# Patient Record
Sex: FEMALE | Race: WHITE | NOT HISPANIC OR LATINO | Employment: FULL TIME | URBAN - METROPOLITAN AREA
[De-identification: names, ages, dates, MRNs, and addresses within clinical notes are randomized per-mention and may not be internally consistent; named-entity substitution may affect disease eponyms.]

---

## 2017-05-02 ENCOUNTER — HOSPITAL ENCOUNTER (OUTPATIENT)
Dept: RADIOLOGY | Facility: MEDICAL CENTER | Age: 57
End: 2017-05-02

## 2017-05-07 ASSESSMENT — ENCOUNTER SYMPTOMS
HEMOPTYSIS: 0
DYSPNEA AT REST: 1
SHORTNESS OF BREATH: 1
WHEEZING: 0
RESPIRATORY SYMPTOMS COMMENTS: SLOW PACE
CHEST TIGHTNESS: 0

## 2017-05-09 ENCOUNTER — HOSPITAL ENCOUNTER (OUTPATIENT)
Dept: RADIOLOGY | Facility: MEDICAL CENTER | Age: 57
End: 2017-05-09

## 2017-05-09 ENCOUNTER — OFFICE VISIT (OUTPATIENT)
Dept: PULMONOLOGY | Facility: HOSPICE | Age: 57
End: 2017-05-09
Payer: COMMERCIAL

## 2017-05-09 VITALS
DIASTOLIC BLOOD PRESSURE: 70 MMHG | BODY MASS INDEX: 20.67 KG/M2 | TEMPERATURE: 97.5 F | WEIGHT: 128.6 LBS | HEIGHT: 66 IN | HEART RATE: 82 BPM | SYSTOLIC BLOOD PRESSURE: 126 MMHG | RESPIRATION RATE: 18 BRPM

## 2017-05-09 DIAGNOSIS — R63.4 WEIGHT LOSS, UNINTENTIONAL: ICD-10-CM

## 2017-05-09 DIAGNOSIS — R06.02 SOB (SHORTNESS OF BREATH): ICD-10-CM

## 2017-05-09 DIAGNOSIS — Z78.9 NONSMOKER: ICD-10-CM

## 2017-05-09 DIAGNOSIS — R93.89 NONSPECIFIC ABNORMAL FINDINGS ON RADIOLOGICAL AND EXAMINATION OF INTRATHORACIC ORGANS: ICD-10-CM

## 2017-05-09 PROCEDURE — 99204 OFFICE O/P NEW MOD 45 MIN: CPT | Performed by: INTERNAL MEDICINE

## 2017-05-09 NOTE — PROGRESS NOTES
"Chief Complaint   Patient presents with   • Establish Care     Referred by  for Lung Mass.       HPI: This patient is a 56 y.o. Female from [place], [place] who is referred for abnormal chest CAT scan. Over the past 6 months she has had epigastric pain after eating associated with nausea and a 30 pound weight loss. She has felt weak and short of breath with mild exertion. She has a mild cough in the mornings, denies hemoptysis or sputum purulence. Abdominal CAT scan showed thickening of the ascending colon and hepatic flexure as well as a right hilar density and left paraesophageal necrotic lymph node. She was referred to gastroenterology with unremarkable endoscopy and colonoscopy per her report. She subsequently had a chest CAT scan on May 1, 2017 showing bilateral hilar and mediastinal lymphadenopathy, with lymph node calcifications, as well as reticular nodular opacities diffusely, for which she is referred for workup. She had a chest CAT scan in 2010 showing calcified hilar lymph nodes however no significant adenopathy.  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 She denies history of any pulmonary disease.      Past Medical History   Diagnosis Date   • Anesthesia      \"had a problem one time\" Epinepherine causes palpatations   • Heart burn    • Pain 08-12-10     neck, shoulders, and arms, 10/10   • Kidney stone    • Chickenpox    • Mumps        Social History     Social History   • Marital Status:      Spouse Name: N/A   • Number of Children: N/A   • Years of Education: N/A     Occupational History   • Not on file.     Social History Main Topics   • Smoking status: Never Smoker    • Smokeless tobacco: Not on file   • Alcohol Use: No   • Drug Use: No   • Sexual Activity: Not on file     Other Topics Concern   • Not on file     Social History Narrative       Family History   Problem Relation Age of Onset   • Heart Disease     • Hypertension     • " "Stroke     • Asthma Mother        Current Outpatient Prescriptions on File Prior to Visit   Medication Sig Dispense Refill   • oxycodone-acetaminophen (PERCOCET) 5-325 MG TABS Take 1-2 Tabs by mouth every 6 hours as needed.     • Acetaminophen (TYLENOL PO) Take 2 Tabs by mouth as needed.     • promethazine (PHENERGAN) 25 MG TABS Take 25 mg by mouth every four hours as needed.     • Oxycodone-Acetaminophen (ROXICET PO) Take 1 Tab by mouth as needed.       No current facility-administered medications on file prior to visit.       Allergies: Fish; Shellfish allergy; Other environmental; and Epinephrine    ROS:   Constitutional: Denies fevers, chills, night sweats, +fatigue, +weight loss  Eyes: Denies vision loss, pain, drainage, double vision  Ears, Nose, Throat: Denies earache, difficulty hearing, tinnitus, nasal congestion, hoarseness  Cardiovascular: Denies chest pain, tightness, palpitations, orthopnea or edema  Respiratory: As in history of present illness  Sleep: Denies daytime sleepiness, snoring, apneas, insomnia, morning headaches  GI: Denies heartburn, dysphagia, +nausea,+ abdominal pain, denies diarrhea or constipation  : Denies frequent urination, hematuria, discharge or painful urination  Musculoskeletal: Denies back pain, painful joints, sore muscles  Neurological: + Generalized weakness , +headaches  Skin: No rashes    Blood pressure 126/70, pulse 82, temperature 36.4 °C (97.5 °F), temperature source Temporal, resp. rate 18, height 1.676 m (5' 5.98\"), weight 58.333 kg (128 lb 9.6 oz).  Multi-Ox Readings  Multi Ox #1     O2 sat % at rest     O2 sat % on exertion     O2 sat average on exertion     Multi Ox #2     O2 sat % at rest     O2 sat % on exertion     O2 sat average on exertion       Oxygen Use     Oxygen Frequency     Duration of need     Is the patient mobile within the home?     CPAP Use?     BIPAP Use?     Servo Titration         Physical Exam:  Appearance: Well-nourished, well-developed, in " no acute distress  HEENT: Normocephalic, atraumatic, white sclera, PERRLA, oropharynx clear  Neck: No adenopathy or masses  Respiratory: no intercostal retractions or accessory muscle use  Lungs auscultation: Clear to auscultation bilaterally  Cardiovascular: Regular rate rhythm. No murmurs, rubs or gallops.  No LE edema  Abdomen: soft, nondistended, nontender to palpation  Gait: Normal  Digits: No clubbing, cyanosis  Motor: No focal deficits  Orientation: Oriented to time, person and place    Diagnosis:  1. Nonspecific abnormal findings on radiological and examination of intrathoracic organs  QUANTIFERON TB GOLD (IN TUBE)    CBC WITH DIFFERENTIAL    COMP METABOLIC PANEL    ANGIOTENSIN I CONVERTING ENZYME    COCCIDIOMYCOSIS PANEL    BRONCHOSCOPY   2. SOB (shortness of breath)     3. Weight loss, unintentional     4. Nonsmoker         Plan:  The patient presents with epigastric pain, unintentional weight loss, with chest CAT scan showing bulky hilar and mediastinal lymphadenopathy with diffuse reticulornodular opacites. She has evidence of prior granulomatous disease.  Potential etiologies include malignancy eg.lymphoma, sarcoidosis, infection (eg. fungal, mycobacterial disease).  Recommend QuantiFERON TB GOLD, coccidiomycosis antibodies.  Obtain complete blood count and comprehensive metabolic panel, ACE level.  Navigational bronchoscopy for lymph node and lung biopsy.  Return for after testing.        Answers for HPI/ROS submitted by the patient on 5/7/2017   What is the reason for your visit today?: Ct scan  Have to stop when walking or walk at a slow pace? : October , Slow pace  Do you cough first thing in the morning or at other times during the day?: Morning   Do you have a cough on most days? If so, how long have you had this cough?: No  Bring up phlegm (mucus, sputum) in the morning or other times during the day?: Mucus/morning   If so, how long have you produced phlegm (Months, Years), and what is the usual  color of your phlegm?: ?  Do you cough up blood from your chest?: No  Do you experience wheezing?: No  Do you experience any chest tightness?: No  Experience shortness of breath?: Yes  Experience shortness of breath at rest?: Yes  How far can you walk before becoming short of breath or need to rest? : Not far  Please list what causes you to become short of breath:: Moving  Have you ever been hospitalized?: Yes  Reason, year, and hospital in which you were hospitalized:: [facility]  Have you ever needed to be intubated or placed on a ventilator? : No  Have you ever had problems with anesthesia?: No  Have you experienced post-operative delirium?: No  Any complications with surgery?: No  What year did you receive your last Flu shot?: [facility]2016  Have you had a TB skin test? If so, please list the year and result:: 2016 ok  Have you had Allergy skin testing? If so, please list the year and result:: To long ago   Please list all your occupations from your first job to your current job. Include Industry/Company, location, year, and your specific job.: [employer],  Please list the places you have lived, the year, and Country or State in the [place]:: [facility] or,[facility]  Birds?: Yes  Dogs?: Yes  Cats?: Yes  Mice and/or Deer Mice?: Yes  Reptiles?: No  Coffee: 2  Tea: 1

## 2017-05-09 NOTE — MR AVS SNAPSHOT
"        Alesia Lewis   2017 10:20 AM   Office Visit   MRN: [id number]    Department:  [facility]   Dept Phone:  [phone]    Description:  Female : 1960   Provider:  Karon Overton M.D.           Reason for Visit     Establish Care Referred by  for Lung Mass.      Allergies as of 2017     Allergen Noted Reactions    Fish 2010   Anaphylaxis    Shellfish Allergy 2010   Anaphylaxis    Other Environmental 2010   Rash    Perfums, bleach, soaps  [facility]    Epinephrine 2010   Palpitations      You were diagnosed with     Nonspecific abnormal findings on radiological and examination of intrathoracic organs   [[id number]]         Vital Signs     Blood Pressure Pulse Temperature Respirations Height Weight    126/70 mmHg 82 36.4 °C (97.5 °F) (Temporal) 18 1.676 m (5' 5.98\") 58.333 kg (128 lb 9.6 oz)    Body Mass Index Smoking Status                20.77 kg/m2 Never Smoker           Basic Information     Date Of Birth Sex Race Ethnicity Preferred Language    1960 Female White Non- English      Your appointments     May 09, 2017 11:00 AM   [facility] with CT FOREIGN OUTSIDE FILM   IMAGING SUPPORT [facility]    [address] [place] [id number]   [phone]              Health Maintenance     Patient has no pending health maintenance at this time      Current Immunizations     No immunizations on file.      Below and/or attached are the medications your provider expects you to take. Review all of your home medications and newly ordered medications with your provider and/or pharmacist. Follow medication instructions as directed by your provider and/or pharmacist. Please keep your medication list with you and share with your provider. Update the information when medications are discontinued, doses are changed, or new medications (including over-the-counter products) are added; and carry medication information at all times " in the event of emergency situations     Allergies:  FISH - Anaphylaxis     SHELLFISH ALLERGY - Anaphylaxis     OTHER ENVIRONMENTAL - Rash     EPINEPHRINE - Palpitations               Medications  Valid as of: May 09, 2017 - 10:54 AM    Generic Name Brand Name Tablet Size Instructions for use    Acetaminophen   Take 2 Tabs by mouth as needed.        Oxycodone-Acetaminophen   Take 1 Tab by mouth as needed.        Oxycodone-Acetaminophen (Tab) PERCOCET 5-325 MG Take 1-2 Tabs by mouth every 6 hours as needed.        Promethazine HCl (Tab) PHENERGAN 25 MG Take 25 mg by mouth every four hours as needed.        .                 Medicines prescribed today were sent to:     [facility] [address] [facility]    [address] [place] [id number]    Phone: [phone] Fax: [phone]    Open 24 Hours?: No      Medication refill instructions:       If your prescription bottle indicates you have medication refills left, it is not necessary to call your provider’s office. Please contact your pharmacy and they will refill your medication.    If your prescription bottle indicates you do not have any refills left, you may request refills at any time through one of the following ways: The online [employer] system (except Urgent Care), by calling your provider’s office, or by asking your pharmacy to contact your provider’s office with a refill request. Medication refills are processed only during regular business hours and may not be available until the next business day. Your provider may request additional information or to have a follow-up visit with you prior to refilling your medication.   *Please Note: Medication refills are assigned a new Rx number when refilled electronically. Your pharmacy may indicate that no refills were authorized even though a new prescription for the same medication is available at the pharmacy. Please request the medicine by name with the pharmacy before contacting your  provider for a refill.        Your To Do List     Future Labs/Procedures Complete By Expires    ANGIOTENSIN I CONVERTING ENZYME  As directed 5/9/2018    BRONCHOSCOPY  As directed 5/9/2018    Comments:    EBUS    CBC WITH DIFFERENTIAL  As directed 5/9/2018    COMP METABOLIC PANEL  As directed 5/9/2018         MyChart Access Code: Activation code not generated  Current MyChart Status: Active

## 2017-05-18 ENCOUNTER — APPOINTMENT (OUTPATIENT)
Dept: RADIOLOGY | Facility: MEDICAL CENTER | Age: 57
End: 2017-05-18
Attending: INTERNAL MEDICINE
Payer: COMMERCIAL

## 2017-05-18 ENCOUNTER — HOSPITAL ENCOUNTER (OUTPATIENT)
Facility: MEDICAL CENTER | Age: 57
End: 2017-05-18
Attending: INTERNAL MEDICINE | Admitting: INTERNAL MEDICINE
Payer: COMMERCIAL

## 2017-05-18 VITALS
OXYGEN SATURATION: 97 % | RESPIRATION RATE: 16 BRPM | HEIGHT: 66 IN | HEART RATE: 60 BPM | TEMPERATURE: 98.7 F | SYSTOLIC BLOOD PRESSURE: 112 MMHG | DIASTOLIC BLOOD PRESSURE: 76 MMHG | WEIGHT: 127.21 LBS | BODY MASS INDEX: 20.44 KG/M2

## 2017-05-18 PROBLEM — R93.89 ABNORMAL RADIOGRAPHIC EXAMINATION: Status: ACTIVE | Noted: 2017-05-18

## 2017-05-18 LAB
APPEARANCE FLD: CLEAR
BASOPHILS NFR FLD: 2 %
BODY FLD TYPE: NORMAL
COLOR FLD: COLORLESS
CSF COMMENTS 1658: NORMAL
EOSINOPHIL NFR FLD: 1 %
GRAM STN SPEC: NORMAL
HISTIOCYTES NFR FLD: 5 %
LYMPHOCYTES NFR FLD: 51 %
MONONUC CELLS NFR FLD: 11 %
NEUTROPHILS NFR FLD: 3 %
RBC # FLD: 111 CELLS/UL
RHODAMINE-AURAMINE STN SPEC: NORMAL
SIGNIFICANT IND 70042: NORMAL
SIGNIFICANT IND 70042: NORMAL
SITE SITE: NORMAL
SITE SITE: NORMAL
SOURCE SOURCE: NORMAL
SOURCE SOURCE: NORMAL
WBC # FLD: 65 CELLS/UL
WBC OTHER NFR FLD: 27 %

## 2017-05-18 PROCEDURE — 88305 TISSUE EXAM BY PATHOLOGIST: CPT | Mod: 59

## 2017-05-18 PROCEDURE — 88173 CYTOPATH EVAL FNA REPORT: CPT

## 2017-05-18 PROCEDURE — 87015 SPECIMEN INFECT AGNT CONCNTJ: CPT

## 2017-05-18 PROCEDURE — 700111 HCHG RX REV CODE 636 W/ 250 OVERRIDE (IP)

## 2017-05-18 PROCEDURE — 160041 HCHG SURGERY MINUTES - EA ADDL 1 MIN LEVEL 4: Performed by: INTERNAL MEDICINE

## 2017-05-18 PROCEDURE — 87206 SMEAR FLUORESCENT/ACID STAI: CPT

## 2017-05-18 PROCEDURE — 160009 HCHG ANES TIME/MIN: Performed by: INTERNAL MEDICINE

## 2017-05-18 PROCEDURE — 87102 FUNGUS ISOLATION CULTURE: CPT

## 2017-05-18 PROCEDURE — 87116 MYCOBACTERIA CULTURE: CPT

## 2017-05-18 PROCEDURE — 160029 HCHG SURGERY MINUTES - 1ST 30 MINS LEVEL 4: Performed by: INTERNAL MEDICINE

## 2017-05-18 PROCEDURE — 700101 HCHG RX REV CODE 250

## 2017-05-18 PROCEDURE — 502240 HCHG MISC OR SUPPLY RC 0272: Performed by: INTERNAL MEDICINE

## 2017-05-18 PROCEDURE — 89051 BODY FLUID CELL COUNT: CPT

## 2017-05-18 PROCEDURE — 87075 CULTR BACTERIA EXCEPT BLOOD: CPT

## 2017-05-18 PROCEDURE — 71010 DX-CHEST-PORTABLE (1 VIEW): CPT

## 2017-05-18 PROCEDURE — 88312 SPECIAL STAINS GROUP 1: CPT

## 2017-05-18 PROCEDURE — 700102 HCHG RX REV CODE 250 W/ 637 OVERRIDE(OP)

## 2017-05-18 PROCEDURE — 160035 HCHG PACU - 1ST 60 MINS PHASE I: Performed by: INTERNAL MEDICINE

## 2017-05-18 PROCEDURE — 88172 CYTP DX EVAL FNA 1ST EA SITE: CPT

## 2017-05-18 PROCEDURE — 87205 SMEAR GRAM STAIN: CPT

## 2017-05-18 PROCEDURE — 160048 HCHG OR STATISTICAL LEVEL 1-5: Performed by: INTERNAL MEDICINE

## 2017-05-18 PROCEDURE — 88112 CYTOPATH CELL ENHANCE TECH: CPT

## 2017-05-18 PROCEDURE — 501629 HCHG TUBE, LUKI TRAP STERILE DISP: Performed by: INTERNAL MEDICINE

## 2017-05-18 PROCEDURE — A9270 NON-COVERED ITEM OR SERVICE: HCPCS

## 2017-05-18 PROCEDURE — 160036 HCHG PACU - EA ADDL 30 MINS PHASE I: Performed by: INTERNAL MEDICINE

## 2017-05-18 PROCEDURE — 160002 HCHG RECOVERY MINUTES (STAT): Performed by: INTERNAL MEDICINE

## 2017-05-18 PROCEDURE — 87070 CULTURE OTHR SPECIMN AEROBIC: CPT

## 2017-05-18 RX ORDER — SODIUM CHLORIDE, SODIUM LACTATE, POTASSIUM CHLORIDE, CALCIUM CHLORIDE 600; 310; 30; 20 MG/100ML; MG/100ML; MG/100ML; MG/100ML
1000 INJECTION, SOLUTION INTRAVENOUS
Status: COMPLETED | OUTPATIENT
Start: 2017-05-18 | End: 2017-05-18

## 2017-05-18 RX ORDER — LIDOCAINE HYDROCHLORIDE 10 MG/ML
INJECTION, SOLUTION INFILTRATION; PERINEURAL
Status: DISCONTINUED
Start: 2017-05-18 | End: 2017-05-18 | Stop reason: HOSPADM

## 2017-05-18 RX ORDER — LIDOCAINE HYDROCHLORIDE 10 MG/ML
INJECTION, SOLUTION INFILTRATION; PERINEURAL
Status: DISCONTINUED | OUTPATIENT
Start: 2017-05-18 | End: 2017-05-18 | Stop reason: HOSPADM

## 2017-05-18 RX ADMIN — HYDROCODONE BITARTRATE AND ACETAMINOPHEN 30 ML: 2.5; 108 SOLUTION ORAL at 11:28

## 2017-05-18 RX ADMIN — FENTANYL CITRATE 25 MCG: 50 INJECTION, SOLUTION INTRAMUSCULAR; INTRAVENOUS at 11:28

## 2017-05-18 RX ADMIN — SODIUM CHLORIDE, SODIUM LACTATE, POTASSIUM CHLORIDE, CALCIUM CHLORIDE 1000 ML: 600; 310; 30; 20 INJECTION, SOLUTION INTRAVENOUS at 08:20

## 2017-05-18 ASSESSMENT — PAIN SCALES - GENERAL
PAINLEVEL_OUTOF10: 0
PAINLEVEL_OUTOF10: 5
PAINLEVEL_OUTOF10: 0
PAINLEVEL_OUTOF10: 4
PAINLEVEL_OUTOF10: 1
PAINLEVEL_OUTOF10: 0
PAINLEVEL_OUTOF10: 4
PAINLEVEL_OUTOF10: 1

## 2017-05-18 NOTE — DISCHARGE INSTRUCTIONS
ACTIVITY: Rest and take it easy for the first 24 hours.  A responsible adult is recommended to remain with you during that time.  It is normal to feel sleepy.  We encourage you to not do anything that requires balance, judgment or coordination.    MILD FLU-LIKE SYMPTOMS ARE NORMAL. YOU MAY EXPERIENCE GENERALIZED MUSCLE ACHES, THROAT IRRITATION, HEADACHE AND/OR SOME NAUSEA.    FOR 24 HOURS DO NOT:  Drive, operate machinery or run household appliances.  Drink beer or alcoholic beverages.   Make important decisions or sign legal documents.    SPECIAL INSTRUCTIONS: ***    DIET: To avoid nausea, slowly advance diet as tolerated, avoiding spicy or greasy foods for the first day.  Add more substantial food to your diet according to your physician's instructions.  Babies can be fed formula or breast milk as soon as they are hungry.  INCREASE FLUIDS AND FIBER TO AVOID CONSTIPATION.    SURGICAL DRESSING/BATHING: *MAY SHOWER TOMORROW    FOLLOW-UP APPOINTMENT:  A follow-up appointment should be arranged with your doctor in *FOLLOW UP WITH DR. HART**; call to schedule.    You should CALL YOUR PHYSICIAN if you develop:  Fever greater than 101 degrees F.  Pain not relieved by medication, or persistent nausea or vomiting.  Excessive bleeding (blood soaking through dressing) or unexpected drainage from the wound.  Extreme redness or swelling around the incision site, drainage of pus or foul smelling drainage.  Inability to urinate or empty your bladder within 8 hours.  Problems with breathing or chest pain.    You should call 911 if you develop problems with breathing or chest pain.  If you are unable to contact your doctor or surgical center, you should go to the nearest emergency room or urgent care center.  Physician's telephone #: *[phone]*    If any questions arise, call your doctor.  If your doctor is not available, please feel free to call the Surgical Center at [phone].  The Center is open Monday through Friday  from 7AM to 7PM.  You can also call the HEALTH HOTLINE open 24 hours/day, 7 days/week and speak to a nurse at [phone], or toll free at [phone].    A registered nurse may call you a few days after your surgery to see how you are doing after your procedure.    MEDICATIONS: Resume taking daily medication.  Take prescribed pain medication with food.  If no medication is prescribed, you may take non-aspirin pain medication if needed.  PAIN MEDICATION CAN BE VERY CONSTIPATING.  Take a stool softener or laxative such as senokot, pericolace, or milk of magnesia if needed.     Prescription given for *NONE**.  Last pain medication given at *____________11:28 AM__________________*.    If your physician has pr***escribed pain medication that includes Acetaminophen (Tylenol), do not take additional Acetaminophen (Tylenol) while taking the prescribed medication.    Depression / Suicide Risk    As you are discharged from this [facility] facility, it is important to learn how to keep safe from harming yourself.    Recognize the warning signs:  · Abrupt changes in personality, positive or negative- including increase in energy   · Giving away possessions  · Change in eating patterns- significant weight changes-  positive or negative  · Change in sleeping patterns- unable to sleep or sleeping all the time   · Unwillingness or inability to communicate  · Depression  · Unusual sadness, discouragement and loneliness  · Talk of wanting to die  · Neglect of personal appearance   · Rebelliousness- reckless behavior  · Withdrawal from people/activities they love  · Confusion- inability to concentrate     If you or a loved one observes any of these behaviors or has concerns about self-harm, here's what you can do:  · Talk about it- your feelings and reasons for harming yourself  · Remove any means that you might use to hurt yourself (examples: pills, rope, extension cords, firearm)  · Get professional help from the community  (Mental Health, Substance Abuse, psychological counseling)  · Do not be alone:Call your Safe Contact- someone whom you trust who will be there for you.  · Call your local CRISIS HOTLINE 486-9414 or 825-947-3178  · Call your local Children's Mobile Crisis Response Team [place] (075) 814-3584 or www.[employer]  · Call the toll free National Suicide Prevention Hotlines   · National Suicide Prevention Lifeline 155-470-FDJU (2192)  [place] [facility] 800-SUICIDE (248-0179)        Bronchoscopy Discharge Instructions  Home Care Instructions    ACTIVITY: Rest and take it easy for the first 24 hours.  A responsible adult is recommended to remain with you during that time.  It is normal to feel sleepy.  We encourage you to not do anything that requires balance, judgment or coordination.    The medicine you had during the bronchoscopy will make you sleepy.    FOR 24 HOURS DO NOT:  Drive, operate machinery or run household appliances.  Drink beer or alcoholic beverages.  Make important decisions or sign legal documents.  Engage in activity that requires sharp judgment and reflexes for 24 hours    SPECIAL INSTRUCTIONS: ***    Bronchoscopy is a procedure to look inside your windpipe and bronchial tubes.  An anesthetic solution is sprayed in your throat to make it numb.    You may experience a mild sore throat, hoarseness, fever up to 101?F, and /or coughing up small amounts of blood immediately following your bronchoscopy, especially if a biopsy was performed.  The discomfort should subside in 24-48 hours.    Do not smoke for 6-8 hours after the procedure to decrease your risk of coughing and /or bleeding.    Do not drink fluids or eat until your gag reflex returns, for two hours after the bronchoscopy.  Otherwise you will not feel the food or fluid in your throat, and it may go down your windpipe and cause you to choke.    Take ice chips or slowly sip cool fluids to make sure your gag reflex has returned.   Avoid hot fluids from the microwave for several hours.    After 2 hours or when you get home you may take throat lozenges or gargle with salt water if your throat is sore.  Drink liquids to help dryness in your mouth and throat.    Resume your normal activities the following day.    MEDICATIONS: Resume taking daily medication as directed by your doctor.  Other Medications:***    A follow-up appointment should be arranged with your doctor in 1 week to get the results of the bronchoscopy and any tests done during the procedure; call to schedule.***      You should CALL YOUR PHYSICIAN if you develop:  Fever greater than 101?F  You cough up more than a teaspoon of blood other than blood-tinged mucus  You have increasing amounts of bleeding from coughing after the bronchoscopy  You are wheezing  You develop any unusual signs or symptoms or have any questions                You should call 911 if you develop problems with breathing or chest pain.    If you are unable to contact your doctor or surgical center, you should go to the nearest emergency room or urgent care center.  Physician's telephone #: ***      If any questions arise, call your doctor.  If your doctor is not available, please feel free to call the Surgical Center at {Surgical Dept Numbers:***}.  The Center is open Monday through Friday from 7AM to 7PM.  You can also call the [employer] HOTLINE open 24 hours/day, 7 days/week and speak to a nurse at [phone], or toll free at [phone].    · You may receive a survey in the mail within the next two weeks and we ask that you take a few moments to complete the survey and return it to us.  Our goal is to provide you with very good care and we value your comments.

## 2017-05-18 NOTE — IP AVS SNAPSHOT
" Home Care Instructions                                                                                                                Name:Alesia Lewis  Medical Record Number:[id number]  CSN: [id number]    YOB: 1960   Age: 56 y.o.  Sex: female  HT:1.676 m (5' 6\") WT: 57.7 kg (127 lb 3.3 oz)          Admit Date: 5/18/2017     Discharge Date:   Today's Date: 5/18/2017  Attending Doctor:  Adonis Hart M.D.                  Allergies:  Fish; Shellfish allergy; Other environmental; Epinephrine; Food; and Latex                Discharge Instructions         ACTIVITY: Rest and take it easy for the first 24 hours.  A responsible adult is recommended to remain with you during that time.  It is normal to feel sleepy.  We encourage you to not do anything that requires balance, judgment or coordination.    MILD FLU-LIKE SYMPTOMS ARE NORMAL. YOU MAY EXPERIENCE GENERALIZED MUSCLE ACHES, THROAT IRRITATION, HEADACHE AND/OR SOME NAUSEA.    FOR 24 HOURS DO NOT:  Drive, operate machinery or run household appliances.  Drink beer or alcoholic beverages.   Make important decisions or sign legal documents.    SPECIAL INSTRUCTIONS: ***    DIET: To avoid nausea, slowly advance diet as tolerated, avoiding spicy or greasy foods for the first day.  Add more substantial food to your diet according to your physician's instructions.  Babies can be fed formula or breast milk as soon as they are hungry.  INCREASE FLUIDS AND FIBER TO AVOID CONSTIPATION.    SURGICAL DRESSING/BATHING: *MAY SHOWER TOMORROW    FOLLOW-UP APPOINTMENT:  A follow-up appointment should be arranged with your doctor in *FOLLOW UP WITH DR. HART**; call to schedule.    You should CALL YOUR PHYSICIAN if you develop:  Fever greater than 101 degrees F.  Pain not relieved by medication, or persistent nausea or vomiting.  Excessive bleeding (blood soaking through dressing) or unexpected drainage from the wound.  Extreme redness or swelling around the incision site, " drainage of pus or foul smelling drainage.  Inability to urinate or empty your bladder within 8 hours.  Problems with breathing or chest pain.    You should call 911 if you develop problems with breathing or chest pain.  If you are unable to contact your doctor or surgical center, you should go to the nearest emergency room or urgent care center.  Physician's telephone #: *[phone]*    If any questions arise, call your doctor.  If your doctor is not available, please feel free to call the Surgical Center at [phone].  The Center is open Monday through Friday from 7AM to 7PM.  You can also call the [place] HOTLINE open 24 hours/day, 7 days/week and speak to a nurse at [phone], or toll free at [phone].    A registered nurse may call you a few days after your surgery to see how you are doing after your procedure.    MEDICATIONS: Resume taking daily medication.  Take prescribed pain medication with food.  If no medication is prescribed, you may take non-aspirin pain medication if needed.  PAIN MEDICATION CAN BE VERY CONSTIPATING.  Take a stool softener or laxative such as senokot, pericolace, or milk of magnesia if needed.    Prescription given for *LINSEY**.  Last pain medication given at *______________________________*.    If your physician has pr***escribed pain medication that includes Acetaminophen (Tylenol), do not take additional Acetaminophen (Tylenol) while taking the prescribed medication.    Depression / Suicide Risk    As you are discharged from this [facility] facility, it is important to learn how to keep safe from harming yourself.    Recognize the warning signs:  · Abrupt changes in personality, positive or negative- including increase in energy   · Giving away possessions  · Change in eating patterns- significant weight changes-  positive or negative  · Change in sleeping patterns- unable to sleep or sleeping all the time   · Unwillingness or inability to  communicate  · Depression  · Unusual sadness, discouragement and loneliness  · Talk of wanting to die  · Neglect of personal appearance   · Rebelliousness- reckless behavior  · Withdrawal from people/activities they love  · Confusion- inability to concentrate     If you or a loved one observes any of these behaviors or has concerns about self-harm, here's what you can do:  · Talk about it- your feelings and reasons for harming yourself  · Remove any means that you might use to hurt yourself (examples: pills, rope, extension cords, firearm)  · Get professional help from the community (Mental Health, Substance Abuse, psychological counseling)  · Do not be alone:Call your Safe Contact- someone whom you trust who will be there for you.  · Call your local CRISIS HOTLINE 887-2217 or 492-194-7344  · Call your local Children's Mobile Crisis Response Team [place] (013) 340-9769 or www.RxVault.in  · Call the toll free National Suicide Prevention Hotlines   · National Suicide Prevention Lifeline 098-415-TENU (4793)  [place] Wifi Online Line Network 800-SUICIDE (454-5809)        Bronchoscopy Discharge Instructions  Home Care Instructions    ACTIVITY: Rest and take it easy for the first 24 hours.  A responsible adult is recommended to remain with you during that time.  It is normal to feel sleepy.  We encourage you to not do anything that requires balance, judgment or coordination.    The medicine you had during the bronchoscopy will make you sleepy.    FOR 24 HOURS DO NOT:  Drive, operate machinery or run household appliances.  Drink beer or alcoholic beverages.  Make important decisions or sign legal documents.  Engage in activity that requires sharp judgment and reflexes for 24 hours    SPECIAL INSTRUCTIONS: ***    Bronchoscopy is a procedure to look inside your windpipe and bronchial tubes.  An anesthetic solution is sprayed in your throat to make it numb.    You may experience a mild sore throat, hoarseness, fever up  to 101?F, and /or coughing up small amounts of blood immediately following your bronchoscopy, especially if a biopsy was performed.  The discomfort should subside in 24-48 hours.    Do not smoke for 6-8 hours after the procedure to decrease your risk of coughing and /or bleeding.    Do not drink fluids or eat until your gag reflex returns, for two hours after the bronchoscopy.  Otherwise you will not feel the food or fluid in your throat, and it may go down your windpipe and cause you to choke.    Take ice chips or slowly sip cool fluids to make sure your gag reflex has returned.  Avoid hot fluids from the microwave for several hours.    After 2 hours or when you get home you may take throat lozenges or gargle with salt water if your throat is sore.  Drink liquids to help dryness in your mouth and throat.    Resume your normal activities the following day.    MEDICATIONS: Resume taking daily medication as directed by your doctor.  Other Medications:***    A follow-up appointment should be arranged with your doctor in 1 week to get the results of the bronchoscopy and any tests done during the procedure; call to schedule.***      You should CALL YOUR PHYSICIAN if you develop:  Fever greater than 101?F  You cough up more than a teaspoon of blood other than blood-tinged mucus  You have increasing amounts of bleeding from coughing after the bronchoscopy  You are wheezing  You develop any unusual signs or symptoms or have any questions                You should call 911 if you develop problems with breathing or chest pain.    If you are unable to contact your doctor or surgical center, you should go to the nearest emergency room or urgent care center.  Physician's telephone #: ***      If any questions arise, call your doctor.  If your doctor is not available, please feel free to call the Surgical Center at {Surgical Dept Numbers:***}.  The Center is open Monday through Friday from 7AM to 7PM.  You can also call the  HEALTH HOTLINE open 24 hours/day, 7 days/week and speak to a nurse at [phone], or toll free at [phone].    · You may receive a survey in the mail within the next two weeks and we ask that you take a few moments to complete the survey and return it to us.  Our goal is to provide you with very good care and we value your comments.       Medication List      ASK your doctor about these medications        Instructions    Morning Afternoon Evening Bedtime    TYLENOL PO        Take 2 Tabs by mouth as needed.   Dose:  2 Tab                                Medication Information     Above and/or attached are the medications your physician expects you to take upon discharge. Review all of your home medications and newly ordered medications with your doctor and/or pharmacist. Follow medication instructions as directed by your doctor and/or pharmacist. Please keep your medication list with you and share with your physician. Update the information when medications are discontinued, doses are changed, or new medications (including over-the-counter products) are added; and carry medication information at all times in the event of emergency situations.        Resources     Quit Smoking / Tobacco Use:    I understand the use of any tobacco products increases my chance of suffering from future heart disease or stroke and could cause other illnesses which may shorten my life. Quitting the use of tobacco products is the single most important thing I can do to improve my health. For further information on smoking / tobacco cessation call a Toll Free Quit Line at 1-744.117.2311 (*National Cancer [place]) or 1-965.419.2410 (American Lung Association) or you can access the web based program at www.lungusa.org.    [place] Tobacco Users Help Line:  (303) 673-3240       Toll Free: 1-271.355.4604  Quit Tobacco Program [facility] (600)305-5757    Crisis Hotline:    [place] Crisis Hotline:  6-384-VDZDRRI or  1-873.808.8472    [place] Crisis Hotline:    1-978.747.5497 or 947-508-8287    Discharge Survey:   Thank you for choosing [facility]. We hope we did everything we could to make your hospital stay a pleasant one. You may be receiving a survey and we would appreciate your time and participation in answering the questions. Your input is very valuable to us in our efforts to improve our service to our patients and their families.            Signatures     My signature on this form indicates that:    1. I acknowledge receipt and understanding of these Home Care Instruction.  2. My questions regarding this information have been answered to my satisfaction.  3. I have formulated a plan with my discharge nurse to obtain my prescribed medications for home.    __________________________________      __________________________________                   Patient Signature                                 Guardian/Responsible Adult Signature      __________________________________                 __________       ________                       Nurse Signature                                               Date                 Time

## 2017-05-18 NOTE — PROCEDURES
"Bronchoscopy Op Note    Date of Operation: 5/18/2017    Preoperative diagnosis: pulmonary infiltrate (793.1) and hilar/mediastinal lymphadenopathy    Postoperative diagnosis: pulmonary infiltrate (793.1)    Operation preformed: Bronchoscopy    Surgeon/Endoscopist: Dr. Warner    Anesthesia: General Anesthesia    Indications: The patient is a 56 y.o. female with pulmonary infiltrate (793.1). Fiberoptic bronchoscopy with endobronchial ultrasound is carried out in the [facility].    Findings: findings included lymphadenopathy.    Specimen: Bronchoalveolar lavage fluid for Gram stain and quantitative cultures. FNA of LN station 11L, 7, 11R, transbronchial biopsy of RUL and RLL.    Procedure: Following informed consent, the patient was properly identified and optimally positioned in bed.  The patient was preoxygenated with 100% oxygen and placed on a set ventilator rate.  A \"time out\" was initiated. The correct patient, procedure and operative site were verified. The patient's allergy status was assessed. Procedural modifications were made as required.    The therapeutic fiberoptic bronchoscope was first advanced through the LMA device. The vocal cords appeared normal and movement was without abnormalities. Topical lidocaine was used to anesthetize this region. Scope was advanced, with topical lidocaine again being applied to the proximal, distal trachea, as well as jose and right and left main stem bronch. The endobronchial mucosa was normal and without inflammation or friability. No lesions were observed. There were mild amounts of thin, clear secretions, possibly due to bronchorrhea from lidocaine. Scope was advanced to RML subsegments and wedged in position. 90ml of sterile saline was instilled, with 35ml being aspirated back. This was sent for analysis. Next, the RUL subsegments were located and 6 passes were made using biopsy forceps. The same was done with the RLL subsegments. These specimen were submitted for " culture and the rest placed in formalin. There was no bleeding emanating from either of these sites.    Next, an EBUS-bronchoscope was advanced and hilar/mediastinal survey was completed. First, a 2-cm station 11L LN was located and multiple FNA biopsies were obtained via a 21g Olympus needle. Next, a conglomeration of matted station 7 LN's were observed. Multiple FNA biopsies were obtained here as well. A station 4R LN was difficult to access. Station 11R appeared to be enlarged ~1.5cm. Multiple FNA biopsies were obtained here and placed in RPMI solution. The scope was removed. An airway inspection was conducted with therapeutic bronchoscope with again, no bleeding present.   The scope was then withdrawn and the working channel was copiously irrigated to remove any residual secretions. The patient tolerated the procedure well and there were no apparent complications.    A CXR is pending.    Adonis Warner MD  [facility]

## 2017-05-18 NOTE — IP AVS SNAPSHOT
5/18/2017    Alesia Lewis  [id number] [facility] [address]    Dear Alesia:    [facility] wants to ensure your discharge home is safe and you or your loved ones have had all of your questions answered regarding your care after you leave the hospital.    Below is a list of resources and contact information should you have any questions regarding your hospital stay, follow-up instructions, or active medical symptoms.    Questions or Concerns Regarding… Contact   Medical Questions Related to Your Discharge  (7 days a week, 8am-5pm) Contact a Nurse Care Coordinator   [phone]   Medical Questions Not Related to Your Discharge  (24 hours a day / 7 days a week)  Contact the Nurse Health Line   [phone]    Medications or Discharge Instructions Refer to your discharge packet   or contact your [facility] Primary Care Provider   [phone]   Follow-up Appointment(s) Schedule your appointment via [employer]   or contact Scheduling [phone]   Billing Review your statement via [employer]  or contact Billing [phone]   Medical Records Review your records via [employer]   or contact Medical Records [phone]     You may receive a telephone call within two days of discharge. This call is to make certain you understand your discharge instructions and have the opportunity to have any questions answered. You can also easily access your medical information, test results and upcoming appointments via the [employer] free online health management tool. You can learn more and sign up at [employer]. For assistance setting up your [employer] account, please call [phone].    Once again, we want to ensure your discharge home is safe and that you have a clear understanding of any next steps in your care. If you have any questions or concerns, please do not hesitate to contact us, we are here for you. Thank you for choosing [facility] for your healthcare needs.    Sincerely,    Your [facility] Healthcare Team

## 2017-05-18 NOTE — OR NURSING
1036  RECEIVED PATIENT FROM OR.  REPORT FROM DR. SOLOMON.  NO AIRWAY IN PLACE.  RESPIRATIONS ARE EVEN AND UNLABORED.  NO BLEEDING NOTED.      1106  PATIENT TAKING PO WITHOUT DIFFICULTY.  DENIES PAIN.    1118   JUAN TO BEDSIDE.    1125  EATING A POPSICLE.    1128  MEDICATED WITH IV AND PO PAIN MEDICATION FOR C/O LUNG PAIN 5.       1241  UP TO THE BATHROOM.    1333  DISCHARGED.  DISCHARGE INSTRUCTIONS GIVEN TO PATIENT AND HER .  A VERBAL UNDERSTANDING OF ALL INSTRUCTIONS WAS STATED.  PATIENT TAKING PO, VOIDING AND AMBULATING WITHOUT DIFFICULTY.  NO BLOODY SPUTUM NOTED.  PATIENT STATES SHE IS READY TO GO HOME.

## 2017-05-20 LAB
BACTERIA SPEC RESP CULT: NORMAL
GRAM STN SPEC: NORMAL
SIGNIFICANT IND 70042: NORMAL
SITE SITE: NORMAL
SOURCE SOURCE: NORMAL

## 2017-05-21 LAB
BACTERIA SPEC ANAEROBE CULT: NORMAL
SIGNIFICANT IND 70042: NORMAL
SITE SITE: NORMAL
SOURCE SOURCE: NORMAL

## 2017-05-30 ENCOUNTER — OFFICE VISIT (OUTPATIENT)
Dept: PULMONOLOGY | Facility: HOSPICE | Age: 57
End: 2017-05-30
Payer: COMMERCIAL

## 2017-05-30 VITALS
DIASTOLIC BLOOD PRESSURE: 62 MMHG | RESPIRATION RATE: 18 BRPM | OXYGEN SATURATION: 96 % | TEMPERATURE: 97.3 F | BODY MASS INDEX: 20.18 KG/M2 | WEIGHT: 125.6 LBS | HEIGHT: 66 IN | SYSTOLIC BLOOD PRESSURE: 122 MMHG | HEART RATE: 82 BPM

## 2017-05-30 DIAGNOSIS — R59.9 LYMPH NODE ENLARGEMENT: ICD-10-CM

## 2017-05-30 DIAGNOSIS — D86.9 SARCOID: ICD-10-CM

## 2017-05-30 PROCEDURE — 99214 OFFICE O/P EST MOD 30 MIN: CPT | Performed by: INTERNAL MEDICINE

## 2017-05-30 RX ORDER — PREDNISONE 10 MG/1
TABLET ORAL
Qty: 70 TAB | Refills: 2 | Status: SHIPPED | OUTPATIENT
Start: 2017-05-30 | End: 2019-09-06

## 2017-05-30 RX ORDER — POLYETHYLENE GLYCOL-3350, SODIUM CHLORIDE, POTASSIUM CHLORIDE AND SODIUM BICARBONATE 420; 11.2; 5.72; 1.48 G/438.4G; G/438.4G; G/438.4G; G/438.4G
POWDER, FOR SOLUTION ORAL
Refills: 0 | COMMUNITY
Start: 2017-04-06 | End: 2019-09-06

## 2017-05-30 NOTE — PROGRESS NOTES
"Chief Complaint   Patient presents with   • Results     Bronch results.     HPI: This patient is a 56 y.o. Female from [place], [place] who returns for bronchoscopy results. Over the past 6 months she has had epigastric pain after eating associated with nausea and a 30 pound weight loss. She feels weak and short of breath with mild exertion. She has a mild cough in the mornings, denies hemoptysis or sputum purulence. Abdominal CAT scan showed thickening of the ascending colon and hepatic flexure as well as a right hilar density and left paraesophageal necrotic lymph node. She was referred to gastroenterology with unremarkable endoscopy and colonoscopy per her report. She subsequently had a chest CAT scan on May 1, 2017 showing bilateral hilar and mediastinal lymphadenopathy, with lymph node calcifications, as well as reticular nodular opacities diffusely. She had a chest CAT scan in 2010 showing calcified hilar lymph nodes however no significant adenopathy.  She underwent EBUS on May 18, 2017 showing normal endobronchial mucosa, no significant secretions. Bronchial cultures are negative to date. 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 Complete blood count was normal. Comprehensive metabolic panel was within normal limits. Ace level was normal. Her QuantiFERON TB Gold and coccidiomycosis panel were not obtainedS By the lab.   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       Past Medical History   Diagnosis Date   • Anesthesia      \"had a problem one time\" Epinepherine causes palpatations   • Heart burn    • Pain 08-12-10     neck, shoulders, and arms, 10/10   • Kidney stone    • Chickenpox    • Mumps        Social " "History     Social History   • Marital Status:      Spouse Name: N/A   • Number of Children: N/A   • Years of Education: N/A     Occupational History   • Not on file.     Social History Main Topics   • Smoking status: Never Smoker    • Smokeless tobacco: Not on file   • Alcohol Use: No   • Drug Use: No   • Sexual Activity: Not on file     Other Topics Concern   • Not on file     Social History Narrative       Family History   Problem Relation Age of Onset   • Heart Disease     • Hypertension     • Stroke     • Asthma Mother        Current Outpatient Prescriptions on File Prior to Visit   Medication Sig Dispense Refill   • Acetaminophen (TYLENOL PO) Take 2 Tabs by mouth as needed.       No current facility-administered medications on file prior to visit.       Allergies: Fish; Shellfish allergy; Other environmental; Epinephrine; Food; and Latex    ROS:   Constitutional: As in history of present illness  Eyes: Denies vision loss, pain, drainage, double vision  Ears, Nose, Throat: Denies earache, difficulty hearing, tinnitus, nasal congestion, hoarseness  Cardiovascular: Denies chest pain, tightness, palpitations, orthopnea or edema  Respiratory: As in history of present illness  Sleep: Denies daytime sleepiness, snoring, apneas, insomnia, morning headaches  GI: Denies heartburn, dysphagia, nausea, abdominal pain, diarrhea or constipation  : Denies frequent urination, hematuria, discharge or painful urination  Musculoskeletal: Denies back pain, painful joints, sore muscles  Neurological: Denies weakness or headaches  Skin: + rashes on face/trunk    Blood pressure 122/62, pulse 82, temperature 36.3 °C (97.3 °F), resp. rate 18, height 1.676 m (5' 6\"), weight 56.972 kg (125 lb 9.6 oz), last menstrual period 07/01/2010, SpO2 96 %.  Multi-Ox Readings  Multi Ox #1     O2 sat % at rest     O2 sat % on exertion     O2 sat average on exertion     Multi Ox #2     O2 sat % at rest     O2 sat % on exertion     O2 sat " average on exertion       Oxygen Use     Oxygen Frequency     Duration of need     Is the patient mobile within the home?     CPAP Use?     BIPAP Use?     Servo Titration         Physical Exam:  Appearance: Well-nourished, well-developed, in no acute distress  HEENT: Normocephalic, atraumatic, white sclera, PERRLA, oropharynx clear  Neck: No adenopathy or masses  Respiratory: no intercostal retractions or accessory muscle use  Lungs auscultation: Clear to auscultation bilaterally  Cardiovascular: Regular rate rhythm. No murmurs, rubs or gallops.  No LE edema  Abdomen: soft, nondistended  Gait: Normal  Digits: No clubbing, cyanosis  Motor: No focal deficits  Orientation: Oriented to time, person and place    Diagnosis:  1. Lymph node enlargement  QUANTIFERON TB GOLD (IN TUBE)    COCCIDIOMYCOSIS PANEL    AMB PULMONARY FUNCTION TEST/LAB    predniSONE (DELTASONE) 10 MG Tab   2. Sarcoid (CMS-[facility])         Plan:  The patient's bronchoscopic biopsies showed no evidence of malignancy, but rather scattered loose granulomas. Fungal and acid-fast bacilli stains and cultures are negative. Given the radiographic appearance, suspect pulmonary sarcoidosis.  We will verify QuantiFERON TB Gold and coccidiomycosis panel.  For treatment she will start prednisone 30 mg daily for 2 weeks, tapered to 20 mg daily until her follow-up visit. We discussed the various potential adverse effects of steroids, eg. Weight gain, emotional lability, gastric upset, diabetes, etc. she expressed understanding and would like to proceed with treatment.  Obtain full pulmonary function testing with [facility].  Return in about 4 weeks (around 6/27/2017) for follow up visit with Karon Overton MD, with PFT.

## 2017-05-30 NOTE — MR AVS SNAPSHOT
"        Alesia Lewis   2017 2:00 PM   Office Visit   MRN: [id number]    Department:  [facility]   Dept Phone:  [phone]    Description:  Female : 1960   Provider:  Karon Overton M.D.           Reason for Visit     Results Bronch results.      Allergies as of 2017     Allergen Noted Reactions    Fish 2010   Anaphylaxis    Shellfish Allergy 2010   Anaphylaxis    Other Environmental 2010   Rash    Perfums, bleach, soaps  [facility]    Epinephrine 2010   Rash, Palpitations    .    Food 2017       Tomatoes and walnuts cause blisters in mouth      Latex 2017   Rash    .        You were diagnosed with     Lymph node enlargement   [161522]         Vital Signs     Blood Pressure Pulse Temperature Respirations Height Weight    122/62 mmHg 82 36.3 °C (97.3 °F) 18 1.676 m (5' 6\") 56.972 kg (125 lb 9.6 oz)    Body Mass Index Oxygen Saturation Last Menstrual Period Smoking Status          20.28 kg/m2 96% 2010 Never Smoker         Basic Information     Date Of Birth Sex Race Ethnicity Preferred Language    1960 Female White Non- English      Your appointments     2017  3:00 PM   Pulmonary Function Test with PFT-RM1   [facility] (--)    [address] [facility] [address]  [place] [address]   [phone]            2017  4:00 PM   Established Patient Pul with Karon Overton M.D.   [facility] (--)    [address] [facility] [address]  [place] [address]   [phone]              Problem List              ICD-10-CM Priority Class Noted - Resolved    Abnormal radiographic examination R93.8   2017 - Present      Health Maintenance        Date Due Completion Dates    IMM DTaP/Tdap/Td Vaccine (1 - Tdap) 1981 ---    PAP SMEAR 1983 ---    MAMMOGRAM 2002 ---    COLONOSCOPY 2012 ---            Current Immunizations     No immunizations on file.      Below and/or " attached are the medications your provider expects you to take. Review all of your home medications and newly ordered medications with your provider and/or pharmacist. Follow medication instructions as directed by your provider and/or pharmacist. Please keep your medication list with you and share with your provider. Update the information when medications are discontinued, doses are changed, or new medications (including over-the-counter products) are added; and carry medication information at all times in the event of emergency situations     Allergies:  FISH - Anaphylaxis     SHELLFISH ALLERGY - Anaphylaxis     OTHER ENVIRONMENTAL - Rash     EPINEPHRINE - Rash,Palpitations     FOOD - (reactions not documented)     LATEX - Rash               Medications  Valid as of: May 30, 2017 -  2:12 PM    Generic Name Brand Name Tablet Size Instructions for use    Acetaminophen   Take 2 Tabs by mouth as needed.        PEG 3350-KCl-Na Bicarb-NaCl (Recon Soln) GAVILYTE-N WITH FLAVOR PACK 420 G AS DIRECTED        PredniSONE (Tab) DELTASONE 10 MG Take 3 tablets daily for 2 weeks, followed by 2 tablets daily        .                 Medicines prescribed today were sent to:     [address] [address]    Phone: [phone] Fax: [phone]    Open 24 Hours?: No      Medication refill instructions:       If your prescription bottle indicates you have medication refills left, it is not necessary to call your provider’s office. Please contact your pharmacy and they will refill your medication.    If your prescription bottle indicates you do not have any refills left, you may request refills at any time through one of the following ways: The online [employer] system (except Urgent Care), by calling your provider’s office, or by asking your pharmacy to contact your provider’s office with a refill request. Medication refills are processed only during regular  business hours and may not be available until the next business day. Your provider may request additional information or to have a follow-up visit with you prior to refilling your medication.   *Please Note: Medication refills are assigned a new Rx number when refilled electronically. Your pharmacy may indicate that no refills were authorized even though a new prescription for the same medication is available at the pharmacy. Please request the medicine by name with the pharmacy before contacting your provider for a refill.        Your To Do List     Future Labs/Procedures Complete By Expires    AMB PULMONARY FUNCTION TEST/LAB  As directed 5/30/2018         [employer] Access Code: Activation code not generated  Current [employer] Status: Active

## 2017-06-14 LAB
FUNGUS SPEC CULT: NORMAL
SIGNIFICANT IND 70042: NORMAL
SITE SITE: NORMAL
SOURCE SOURCE: NORMAL

## 2017-07-06 ENCOUNTER — OFFICE VISIT (OUTPATIENT)
Dept: PULMONOLOGY | Facility: HOSPICE | Age: 57
End: 2017-07-06
Payer: COMMERCIAL

## 2017-07-06 ENCOUNTER — NON-PROVIDER VISIT (OUTPATIENT)
Dept: PULMONOLOGY | Facility: HOSPICE | Age: 57
End: 2017-07-06
Payer: COMMERCIAL

## 2017-07-06 VITALS
TEMPERATURE: 98.1 F | BODY MASS INDEX: 22.82 KG/M2 | WEIGHT: 137 LBS | HEIGHT: 65 IN | SYSTOLIC BLOOD PRESSURE: 122 MMHG | DIASTOLIC BLOOD PRESSURE: 80 MMHG | RESPIRATION RATE: 16 BRPM | HEART RATE: 81 BPM | OXYGEN SATURATION: 95 %

## 2017-07-06 DIAGNOSIS — R59.9 LYMPH NODE ENLARGEMENT: ICD-10-CM

## 2017-07-06 DIAGNOSIS — D86.9 SARCOID: ICD-10-CM

## 2017-07-06 PROCEDURE — 94726 PLETHYSMOGRAPHY LUNG VOLUMES: CPT | Performed by: INTERNAL MEDICINE

## 2017-07-06 PROCEDURE — 94729 DIFFUSING CAPACITY: CPT | Performed by: INTERNAL MEDICINE

## 2017-07-06 PROCEDURE — 94060 EVALUATION OF WHEEZING: CPT | Performed by: INTERNAL MEDICINE

## 2017-07-06 PROCEDURE — 99214 OFFICE O/P EST MOD 30 MIN: CPT | Performed by: INTERNAL MEDICINE

## 2017-07-06 ASSESSMENT — PULMONARY FUNCTION TESTS
FEV1_PREDICTED: 2.79
FEV1/FVC_PERCENT_PREDICTED: 108
FEV1: 2.51
FEV1/FVC_PERCENT_PREDICTED: 103
FEV1: 2.52
FEV1/FVC_PERCENT_PREDICTED: 78
FEV1_PERCENT_CHANGE: 0
FVC: 2.99
FVC_PERCENT_PREDICTED: 83
FEV1_PERCENT_CHANGE: -4
FEV1_PERCENT_PREDICTED: 90
FEV1/FVC: 83.95
FVC_PREDICTED: 3.57
FVC_PERCENT_PREDICTED: 87
FEV1_PERCENT_PREDICTED: 90
FEV1/FVC_PERCENT_CHANGE: 0
FEV1/FVC: 81
FVC: 3.12

## 2017-07-06 NOTE — PROCEDURES
Technician: Kyrie Conteh RRT/SUSY  Good patient effort & cooperation.  The results of this test meet the ATS standards for acceptability and repeatability.  The DLCO was uncorrected for Hgb.  A bronchodilator of Ventolin HFA- 2 puffs via spacer  Administered.    The FVC is 3.12 L or 87%, FEV1 is 2.52 L or 90%, FEV1/FVC 81%. %. DLCO 145%. No bronchodilator response.    Interpretation:  Normal pulmonary function and diffusion capacity.

## 2017-07-06 NOTE — MR AVS SNAPSHOT
"        Alesia Lewis   2017 4:00 PM   Office Visit   MRN: [id number]    Department:  [facility]   Dept Phone:  [phone]    Description:  Female : 1960   Provider:  Karon Overton M.D.           Reason for Visit     Results CPFT results.      Allergies as of 2017     Allergen Noted Reactions    Fish 2010   Anaphylaxis    Shellfish Allergy 2010   Anaphylaxis    Other Environmental 2010   Rash    Perfums, bleach, soaps  [facility]    Epinephrine 2010   Rash, Palpitations    .    Food 2017       Tomatoes and walnuts cause blisters in mouth      Latex 2017   Rash    .        You were diagnosed with     Sarcoid (CMS-[facility])   [[id number]]         Vital Signs     Blood Pressure Pulse Temperature Respirations Height Weight    122/80 mmHg 81 36.7 °C (98.1 °F) 16 1.651 m (5' 5\") 62.143 kg (137 lb)    Body Mass Index Oxygen Saturation Last Menstrual Period Smoking Status          22.80 kg/m2 95% 2010 Never Smoker         Basic Information     Date Of Birth Sex Race Ethnicity Preferred Language    1960 Female White Non- English      Your appointments     Sep 19, 2017 10:40 AM   Established Patient Pul with Karon Overton M.D.   [facility] Pulmonary Medicine (--)    [address] [place] [id number] [phone]              Problem List              ICD-10-CM Priority Class Noted - Resolved    Abnormal radiographic examination R93.8   2017 - Present      Health Maintenance        Date Due Completion Dates    IMM DTaP/Tdap/Td Vaccine (1 - Tdap) 1981 ---    PAP SMEAR 1983 ---    MAMMOGRAM 2002 ---    COLONOSCOPY 2012 ---    IMM INFLUENZA (1) 2017 ---            Current Immunizations     No immunizations on file.      Below and/or attached are the medications your provider expects you to take. Review all of your home medications and newly ordered medications with your provider and/or " pharmacist. Follow medication instructions as directed by your provider and/or pharmacist. Please keep your medication list with you and share with your provider. Update the information when medications are discontinued, doses are changed, or new medications (including over-the-counter products) are added; and carry medication information at all times in the event of emergency situations     Allergies:  FISH - Anaphylaxis     SHELLFISH ALLERGY - Anaphylaxis     OTHER ENVIRONMENTAL - Rash     EPINEPHRINE - Rash,Palpitations     FOOD - (reactions not documented)     LATEX - Rash               Medications  Valid as of: July 06, 2017 -  4:34 PM    Generic Name Brand Name Tablet Size Instructions for use    Acetaminophen   Take 2 Tabs by mouth as needed.        PEG 3350-KCl-Na Bicarb-NaCl (Recon Soln) GAVILYTE-N WITH FLAVOR PACK 420 G AS DIRECTED        PredniSONE (Tab) DELTASONE 10 MG Take 3 tablets daily for 2 weeks, followed by 2 tablets daily        .                 Medicines prescribed today were sent to:     [facility] [address] [address]    Phone: [phone] Fax: [phone]    Open 24 Hours?: No      Medication refill instructions:       If your prescription bottle indicates you have medication refills left, it is not necessary to call your provider’s office. Please contact your pharmacy and they will refill your medication.    If your prescription bottle indicates you do not have any refills left, you may request refills at any time through one of the following ways: The online [employer] system (except Urgent Care), by calling your provider’s office, or by asking your pharmacy to contact your provider’s office with a refill request. Medication refills are processed only during regular business hours and may not be available until the next business day. Your provider may request additional information or to have a follow-up visit with you  prior to refilling your medication.   *Please Note: Medication refills are assigned a new Rx number when refilled electronically. Your pharmacy may indicate that no refills were authorized even though a new prescription for the same medication is available at the pharmacy. Please request the medicine by name with the pharmacy before contacting your provider for a refill.           [employer] Access Code: Activation code not generated  Current [employer] Status: Active

## 2017-07-06 NOTE — MR AVS SNAPSHOT
Alesia Lewis   2017 3:00 PM   Appointment   MRN: [id number]    Department:  [facility]   Dept Phone:  [phone]    Description:  Female : 1960   Provider:  PFT-[id number]           Allergies as of 2017     Allergen Noted Reactions    Fish 2010   Anaphylaxis    Shellfish Allergy 2010   Anaphylaxis    Other Environmental 2010   Rash    Perfums, bleach, soaps  [facility]    Epinephrine 2010   Rash, Palpitations    .    Food 2017       Tomatoes and walnuts cause blisters in mouth      Latex 2017   Rash    .        Vital Signs     Last Menstrual Period Smoking Status                2010 Never Smoker           Basic Information     Date Of Birth Sex Race Ethnicity Preferred Language    1960 Female White Non- English      Your appointments     2017  4:00 PM   Established Patient Pul with Karon Overton M.D.   [facility] Pulmonary Medicine (--)    [address]  [facility] [id number] [phone]              Problem List              ICD-10-CM Priority Class Noted - Resolved    Abnormal radiographic examination R93.8   2017 - Present      Health Maintenance        Date Due Completion Dates    IMM DTaP/Tdap/Td Vaccine (1 - Tdap) 1981 ---    PAP SMEAR 1983 ---    MAMMOGRAM 2002 ---    COLONOSCOPY 2012 ---    IMM INFLUENZA (1) 2017 ---            Current Immunizations     No immunizations on file.      Below and/or attached are the medications your provider expects you to take. Review all of your home medications and newly ordered medications with your provider and/or pharmacist. Follow medication instructions as directed by your provider and/or pharmacist. Please keep your medication list with you and share with your provider. Update the information when medications are discontinued, doses are changed, or new medications (including over-the-counter products) are added; and  carry medication information at all times in the event of emergency situations     Allergies:  FISH - Anaphylaxis     SHELLFISH ALLERGY - Anaphylaxis     OTHER ENVIRONMENTAL - Rash     EPINEPHRINE - Rash,Palpitations     FOOD - (reactions not documented)     LATEX - Rash               Medications  Valid as of: July 06, 2017 -  3:57 PM    Generic Name Brand Name Tablet Size Instructions for use    Acetaminophen   Take 2 Tabs by mouth as needed.        PEG 3350-KCl-Na Bicarb-NaCl (Recon Soln) GAVILYTE-N WITH FLAVOR PACK 420 G AS DIRECTED        PredniSONE (Tab) DELTASONE 10 MG Take 3 tablets daily for 2 weeks, followed by 2 tablets daily        .                 Medicines prescribed today were sent to:     [facility] [address]    [address] [place] [address]    Phone: [phone] Fax: [phone]    Open 24 Hours?: No      Medication refill instructions:       If your prescription bottle indicates you have medication refills left, it is not necessary to call your provider’s office. Please contact your pharmacy and they will refill your medication.    If your prescription bottle indicates you do not have any refills left, you may request refills at any time through one of the following ways: The online [employer] system (except Urgent Care), by calling your provider’s office, or by asking your pharmacy to contact your provider’s office with a refill request. Medication refills are processed only during regular business hours and may not be available until the next business day. Your provider may request additional information or to have a follow-up visit with you prior to refilling your medication.   *Please Note: Medication refills are assigned a new Rx number when refilled electronically. Your pharmacy may indicate that no refills were authorized even though a new prescription for the same medication is available at the pharmacy. Please request the medicine by name  with the pharmacy before contacting your provider for a refill.           MyChart Access Code: Activation code not generated  Current MyChart Status: Active

## 2017-07-07 NOTE — PROGRESS NOTES
"Chief Complaint   Patient presents with   • Results     CPFT results.     HPI: This patient is a 56 y.o. Female from [place], [place] who returns for sarcoidosis. To reiterate, over the past 6 months she has had epigastric pain after eating associated with nausea and a 30 pound weight loss. She felt weak and short of breath with mild exertion. She had a mild cough in the mornings, denies hemoptysis or sputum purulence. Abdominal CAT scan showed thickening of the ascending colon and hepatic flexure as well as a right hilar density and left paraesophageal necrotic lymph node. She was referred to gastroenterology with unremarkable endoscopy and colonoscopy per her report. She subsequently had a chest CAT scan on May 1, 2017 showing bilateral hilar and mediastinal lymphadenopathy, with lymph node calcifications, as well as reticular nodular opacities diffusely. She had a chest CAT scan in 2010 showing calcified hilar lymph nodes however no significant adenopathy.  She underwent EBUS on May 18, 2017 showing normal endobronchial mucosa, no significant secretions. Bronchial cultures are negative to date. 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 Complete blood count was normal. Comprehensive metabolic panel was within normal limits. Ace level was normal. Her QuantiFERON TB Gold and coccidiomycosis panel are negative.  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   She was started on oral prednisone 30 mg daily which is tapered to 20 mg daily. She is experiencing some insomnia, however overall has had improved weight gain, fatigue, and resolved cough. Pulmonary function testing is normal with total lung capacity 101%, DLCO " "145% predicted.      Past Medical History   Diagnosis Date   • Anesthesia      \"had a problem one time\" Epinepherine causes palpatations   • Heart burn    • Pain 08-12-10     neck, shoulders, and arms, 10/10   • Kidney stone    • Chickenpox    • Mumps        Social History     Social History   • Marital Status:      Spouse Name: N/A   • Number of Children: N/A   • Years of Education: N/A     Occupational History   • Not on file.     Social History Main Topics   • Smoking status: Never Smoker    • Smokeless tobacco: Not on file   • Alcohol Use: No   • Drug Use: No   • Sexual Activity: Not on file     Other Topics Concern   • Not on file     Social History Narrative       Family History   Problem Relation Age of Onset   • Heart Disease     • Hypertension     • Stroke     • Asthma Mother        Current Outpatient Prescriptions on File Prior to Visit   Medication Sig Dispense Refill   • predniSONE (DELTASONE) 10 MG Tab Take 3 tablets daily for 2 weeks, followed by 2 tablets daily 70 Tab 2   • GAVILYTE-N WITH FLAVOR PACK 420 G solution AS DIRECTED  0   • Acetaminophen (TYLENOL PO) Take 2 Tabs by mouth as needed.       No current facility-administered medications on file prior to visit.       Allergies: Fish; Shellfish allergy; Other environmental; Epinephrine; Food; and Latex    ROS:   Constitutional: Denies fevers, chills, night sweats, fatigue or weight loss  Eyes: Denies vision loss, pain, drainage, double vision  Ears, Nose, Throat: Denies earache, difficulty hearing, tinnitus, nasal congestion, hoarseness  Cardiovascular: Denies chest pain, tightness, palpitations, orthopnea or edema  Respiratory: Denies shortness of breath, cough, wheezing, hemoptysis  Sleep: Denies daytime sleepiness, snoring, apneas, insomnia, morning headaches  GI: Denies heartburn, dysphagia, nausea, abdominal pain, diarrhea or constipation  : Denies frequent urination, hematuria, discharge or painful urination  Musculoskeletal: Denies " "back pain, painful joints, sore muscles  Neurological: Denies weakness or headaches  Skin: No rashes    Blood pressure 122/80, pulse 81, temperature 36.7 °C (98.1 °F), resp. rate 16, height 1.651 m (5' 5\"), weight 62.143 kg (137 lb), last menstrual period 07/01/2010, SpO2 95 %.    Physical Exam:  Appearance: Well-nourished, well-developed, in no acute distress  HEENT: Normocephalic, atraumatic, white sclera, PERRLA, oropharynx clear  Neck: No adenopathy or masses  Respiratory: no intercostal retractions or accessory muscle use  Lungs auscultation: Clear to auscultation bilaterally  Cardiovascular: Regular rate rhythm. No murmurs, rubs or gallops.  No LE edema  Abdomen: soft, nondistended  Gait: Normal  Digits: No clubbing, cyanosis  Motor: No focal deficits  Orientation: Oriented to time, person and place    Diagnosis:  1. Sarcoid (CMS-[facility])         Plan:  The patient has sarcoidosis, on prednisone, which she is gradually tapering. She is tolerating prednisone reasonably well although is experiencing insomnia. Subjectively she is at improvement in both respiratory and constitutional symptoms. Her pulmonary function testing is normal.   We will taper prednisone to 10 mg daily for the following 2 months.   Update chest CAT scan with contrast prior to follow-up.  Return in about 3 months (around 9/20/2017).        "

## 2017-07-13 LAB
MYCOBACTERIUM SPEC CULT: NORMAL
RHODAMINE-AURAMINE STN SPEC: NORMAL
SIGNIFICANT IND 70042: NORMAL
SITE SITE: NORMAL
SOURCE SOURCE: NORMAL

## 2017-08-28 RX ORDER — PREDNISONE 10 MG/1
TABLET ORAL
Qty: 60 TAB | Refills: 1 | Status: SHIPPED | OUTPATIENT
Start: 2017-08-28 | End: 2019-11-26

## 2017-09-25 ENCOUNTER — TELEPHONE (OUTPATIENT)
Dept: PULMONOLOGY | Facility: HOSPICE | Age: 57
End: 2017-09-25

## 2017-09-25 DIAGNOSIS — D86.9 SARCOIDOSIS: ICD-10-CM

## 2017-09-25 NOTE — TELEPHONE ENCOUNTER
"Please sign for referral to see Dr. navi Sheehan [facility] and critical caer  Patient is requesting \"Looking to see what else is going on\"  "

## 2018-08-09 ENCOUNTER — HOSPITAL ENCOUNTER (OUTPATIENT)
Dept: RADIOLOGY | Facility: MEDICAL CENTER | Age: 58
End: 2018-08-09
Attending: INTERNAL MEDICINE
Payer: COMMERCIAL

## 2018-08-09 DIAGNOSIS — D86.0 PULMONARY SARCOIDOSIS (HCC): ICD-10-CM

## 2018-08-09 DIAGNOSIS — D86.9 SARCOIDOSIS: ICD-10-CM

## 2018-08-09 PROCEDURE — A9585 GADOBUTROL INJECTION: HCPCS

## 2018-08-09 PROCEDURE — 700117 HCHG RX CONTRAST REV CODE 255

## 2018-08-09 PROCEDURE — 75561 CARDIAC MRI FOR MORPH W/DYE: CPT

## 2018-08-09 RX ORDER — GADOBUTROL 604.72 MG/ML
15 INJECTION INTRAVENOUS ONCE
Status: COMPLETED | OUTPATIENT
Start: 2018-08-09 | End: 2018-08-09

## 2018-08-09 RX ADMIN — GADOBUTROL 15 ML: 604.72 INJECTION INTRAVENOUS at 15:34

## 2019-01-30 ENCOUNTER — HOSPITAL ENCOUNTER (OUTPATIENT)
Dept: PULMONOLOGY | Facility: MEDICAL CENTER | Age: 59
End: 2019-01-30
Attending: INTERNAL MEDICINE
Payer: COMMERCIAL

## 2019-01-30 PROCEDURE — 94726 PLETHYSMOGRAPHY LUNG VOLUMES: CPT

## 2019-01-30 PROCEDURE — 94729 DIFFUSING CAPACITY: CPT

## 2019-01-30 PROCEDURE — 94010 BREATHING CAPACITY TEST: CPT

## 2019-01-30 ASSESSMENT — PULMONARY FUNCTION TESTS
FVC_PREDICTED: 3.4
FVC_PERCENT_PREDICTED: 88
FVC: 3.03
FEV1/FVC_PERCENT_PREDICTED: 79
FEV1/FVC_PERCENT_PREDICTED: 101
FEV1/FVC_PERCENT_PREDICTED: 99
FEV1: 2.42
FEV1_PERCENT_PREDICTED: 89
FEV1_PREDICTED: 2.69
FEV1/FVC: 80
FEV1_LLN: 2.25
FEV1/FVC_PREDICTED: 80
FEV1/FVC_PERCENT_LLN: 67
FEV1/FVC: 80
FVC_LLN: 2.84

## 2019-01-31 NOTE — PROCEDURES
PULMONARY FUNCTION TEST    DATE OF TEST:  01/30/2019    SPIROMETRY:  Showed normal FVC and FEV1 with normal ratio.    Post-bronchodilator portion was not done.  Flow volume loop was normal in   shape and size.    Lung volumes also were normal with residual volume of 96% of predicted and   total lung capacity of 96% of predicted.    Diffusion capacity was slightly increased at 120% of predicted.    IMPRESSION:  The patient has normal pulmonary function test.  Clinical   correlation is required.       ____________________________________     MD CLAIRE Maurice / NOLVIA    DD:  01/31/2019 12:28:22  DT:  01/31/2019 12:55:06    D#:  [id number]  Job#:  [id number]

## 2019-02-05 PROCEDURE — 94729 DIFFUSING CAPACITY: CPT | Mod: 26 | Performed by: INTERNAL MEDICINE

## 2019-02-05 PROCEDURE — 94060 EVALUATION OF WHEEZING: CPT | Mod: 26 | Performed by: INTERNAL MEDICINE

## 2019-02-05 PROCEDURE — 94726 PLETHYSMOGRAPHY LUNG VOLUMES: CPT | Mod: 26 | Performed by: INTERNAL MEDICINE

## 2019-05-09 ENCOUNTER — HOSPITAL ENCOUNTER (OUTPATIENT)
Dept: RADIOLOGY | Facility: MEDICAL CENTER | Age: 59
End: 2019-05-09
Attending: INTERNAL MEDICINE
Payer: COMMERCIAL

## 2019-05-09 DIAGNOSIS — D86.9 SARCOIDOSIS: ICD-10-CM

## 2019-05-09 DIAGNOSIS — Z79.52 LONG TERM (CURRENT) USE OF SYSTEMIC STEROIDS: ICD-10-CM

## 2019-05-09 PROCEDURE — 77080 DXA BONE DENSITY AXIAL: CPT

## 2019-05-09 PROCEDURE — 77077 JOINT SURVEY SINGLE VIEW: CPT

## 2019-11-01 ENCOUNTER — HOSPITAL ENCOUNTER (OUTPATIENT)
Dept: CARDIOLOGY | Facility: MEDICAL CENTER | Age: 59
End: 2019-11-01
Attending: INTERNAL MEDICINE
Payer: COMMERCIAL

## 2019-11-01 DIAGNOSIS — D86.9 SARCOIDOSIS: ICD-10-CM

## 2019-11-01 LAB
LV EJECT FRACT  99904: 65
LV EJECT FRACT MOD 2C 99903: 65.23
LV EJECT FRACT MOD 4C 99902: 68.84
LV EJECT FRACT MOD BP 99901: 66.16

## 2019-11-01 PROCEDURE — 93306 TTE W/DOPPLER COMPLETE: CPT

## 2019-11-01 PROCEDURE — 93306 TTE W/DOPPLER COMPLETE: CPT | Mod: 26 | Performed by: INTERNAL MEDICINE

## 2020-01-10 ENCOUNTER — APPOINTMENT (OUTPATIENT)
Dept: RADIOLOGY | Facility: MEDICAL CENTER | Age: 60
End: 2020-01-10
Attending: EMERGENCY MEDICINE
Payer: COMMERCIAL

## 2020-01-10 ENCOUNTER — HOSPITAL ENCOUNTER (EMERGENCY)
Facility: MEDICAL CENTER | Age: 60
End: 2020-01-10
Attending: EMERGENCY MEDICINE
Payer: COMMERCIAL

## 2020-01-10 VITALS
WEIGHT: 167.99 LBS | OXYGEN SATURATION: 96 % | DIASTOLIC BLOOD PRESSURE: 76 MMHG | HEART RATE: 63 BPM | BODY MASS INDEX: 27 KG/M2 | RESPIRATION RATE: 15 BRPM | TEMPERATURE: 98.5 F | SYSTOLIC BLOOD PRESSURE: 119 MMHG | HEIGHT: 66 IN

## 2020-01-10 DIAGNOSIS — R10.9 FLANK PAIN: ICD-10-CM

## 2020-01-10 LAB
ALBUMIN SERPL BCP-MCNC: 4.4 G/DL (ref 3.2–4.9)
ALBUMIN/GLOB SERPL: 1.5 G/DL
ALP SERPL-CCNC: 64 U/L (ref 30–99)
ALT SERPL-CCNC: 20 U/L (ref 2–50)
ANION GAP SERPL CALC-SCNC: 8 MMOL/L (ref 0–11.9)
APPEARANCE UR: CLEAR
AST SERPL-CCNC: 17 U/L (ref 12–45)
BACTERIA #/AREA URNS HPF: NEGATIVE /HPF
BASOPHILS # BLD AUTO: 0.7 % (ref 0–1.8)
BASOPHILS # BLD: 0.06 K/UL (ref 0–0.12)
BILIRUB SERPL-MCNC: 1 MG/DL (ref 0.1–1.5)
BILIRUB UR QL STRIP.AUTO: NEGATIVE
BUN SERPL-MCNC: 23 MG/DL (ref 8–22)
CALCIUM SERPL-MCNC: 9.8 MG/DL (ref 8.5–10.5)
CHLORIDE SERPL-SCNC: 103 MMOL/L (ref 96–112)
CO2 SERPL-SCNC: 28 MMOL/L (ref 20–33)
COLOR UR: YELLOW
CREAT SERPL-MCNC: 1.03 MG/DL (ref 0.5–1.4)
EOSINOPHIL # BLD AUTO: 0.13 K/UL (ref 0–0.51)
EOSINOPHIL NFR BLD: 1.6 % (ref 0–6.9)
EPI CELLS #/AREA URNS HPF: NEGATIVE /HPF
ERYTHROCYTE [DISTWIDTH] IN BLOOD BY AUTOMATED COUNT: 47.3 FL (ref 35.9–50)
GLOBULIN SER CALC-MCNC: 2.9 G/DL (ref 1.9–3.5)
GLUCOSE SERPL-MCNC: 76 MG/DL (ref 65–99)
GLUCOSE UR STRIP.AUTO-MCNC: NEGATIVE MG/DL
HCT VFR BLD AUTO: 47.3 % (ref 37–47)
HGB BLD-MCNC: 15.3 G/DL (ref 12–16)
HYALINE CASTS #/AREA URNS LPF: ABNORMAL /LPF
IMM GRANULOCYTES # BLD AUTO: 0.02 K/UL (ref 0–0.11)
IMM GRANULOCYTES NFR BLD AUTO: 0.2 % (ref 0–0.9)
KETONES UR STRIP.AUTO-MCNC: NEGATIVE MG/DL
LEUKOCYTE ESTERASE UR QL STRIP.AUTO: ABNORMAL
LIPASE SERPL-CCNC: 17 U/L (ref 11–82)
LYMPHOCYTES # BLD AUTO: 1.11 K/UL (ref 1–4.8)
LYMPHOCYTES NFR BLD: 13.2 % (ref 22–41)
MCH RBC QN AUTO: 32.5 PG (ref 27–33)
MCHC RBC AUTO-ENTMCNC: 32.3 G/DL (ref 33.6–35)
MCV RBC AUTO: 100.4 FL (ref 81.4–97.8)
MICRO URNS: ABNORMAL
MONOCYTES # BLD AUTO: 0.7 K/UL (ref 0–0.85)
MONOCYTES NFR BLD AUTO: 8.4 % (ref 0–13.4)
NEUTROPHILS # BLD AUTO: 6.36 K/UL (ref 2–7.15)
NEUTROPHILS NFR BLD: 75.9 % (ref 44–72)
NITRITE UR QL STRIP.AUTO: NEGATIVE
NRBC # BLD AUTO: 0 K/UL
NRBC BLD-RTO: 0 /100 WBC
PH UR STRIP.AUTO: 6 [PH] (ref 5–8)
PLATELET # BLD AUTO: 257 K/UL (ref 164–446)
PMV BLD AUTO: 9.1 FL (ref 9–12.9)
POTASSIUM SERPL-SCNC: 4.2 MMOL/L (ref 3.6–5.5)
PROT SERPL-MCNC: 7.3 G/DL (ref 6–8.2)
PROT UR QL STRIP: NEGATIVE MG/DL
RBC # BLD AUTO: 4.71 M/UL (ref 4.2–5.4)
RBC # URNS HPF: ABNORMAL /HPF
RBC UR QL AUTO: ABNORMAL
SODIUM SERPL-SCNC: 139 MMOL/L (ref 135–145)
SP GR UR STRIP.AUTO: 1.02
UROBILINOGEN UR STRIP.AUTO-MCNC: 0.2 MG/DL
WBC # BLD AUTO: 8.4 K/UL (ref 4.8–10.8)
WBC #/AREA URNS HPF: ABNORMAL /HPF

## 2020-01-10 PROCEDURE — 96374 THER/PROPH/DIAG INJ IV PUSH: CPT

## 2020-01-10 PROCEDURE — 96375 TX/PRO/DX INJ NEW DRUG ADDON: CPT

## 2020-01-10 PROCEDURE — 36415 COLL VENOUS BLD VENIPUNCTURE: CPT

## 2020-01-10 PROCEDURE — 83690 ASSAY OF LIPASE: CPT

## 2020-01-10 PROCEDURE — 80053 COMPREHEN METABOLIC PANEL: CPT

## 2020-01-10 PROCEDURE — 85025 COMPLETE CBC W/AUTO DIFF WBC: CPT

## 2020-01-10 PROCEDURE — 74177 CT ABD & PELVIS W/CONTRAST: CPT

## 2020-01-10 PROCEDURE — 700117 HCHG RX CONTRAST REV CODE 255: Performed by: EMERGENCY MEDICINE

## 2020-01-10 PROCEDURE — 99285 EMERGENCY DEPT VISIT HI MDM: CPT

## 2020-01-10 PROCEDURE — 700111 HCHG RX REV CODE 636 W/ 250 OVERRIDE (IP): Performed by: EMERGENCY MEDICINE

## 2020-01-10 PROCEDURE — 81001 URINALYSIS AUTO W/SCOPE: CPT

## 2020-01-10 RX ORDER — ONDANSETRON 2 MG/ML
4 INJECTION INTRAMUSCULAR; INTRAVENOUS ONCE
Status: COMPLETED | OUTPATIENT
Start: 2020-01-10 | End: 2020-01-10

## 2020-01-10 RX ORDER — ONDANSETRON 4 MG/1
4 TABLET, ORALLY DISINTEGRATING ORAL EVERY 6 HOURS PRN
Qty: 10 TAB | Refills: 0 | Status: SHIPPED | OUTPATIENT
Start: 2020-01-10 | End: 2020-03-13

## 2020-01-10 RX ORDER — MORPHINE SULFATE 4 MG/ML
4 INJECTION, SOLUTION INTRAMUSCULAR; INTRAVENOUS ONCE
Status: COMPLETED | OUTPATIENT
Start: 2020-01-10 | End: 2020-01-10

## 2020-01-10 RX ORDER — HYDROCODONE BITARTRATE AND ACETAMINOPHEN 5; 325 MG/1; MG/1
1 TABLET ORAL EVERY 6 HOURS PRN
Qty: 14 TAB | Refills: 0 | Status: SHIPPED | OUTPATIENT
Start: 2020-01-10 | End: 2020-01-14

## 2020-01-10 RX ADMIN — ONDANSETRON 4 MG: 2 INJECTION INTRAMUSCULAR; INTRAVENOUS at 21:43

## 2020-01-10 RX ADMIN — MORPHINE SULFATE 4 MG: 4 INJECTION INTRAVENOUS at 21:42

## 2020-01-10 RX ADMIN — IOHEXOL 15 ML: 240 INJECTION, SOLUTION INTRATHECAL; INTRAVASCULAR; INTRAVENOUS; ORAL at 22:53

## 2020-01-10 RX ADMIN — IOHEXOL 100 ML: 350 INJECTION, SOLUTION INTRAVENOUS at 22:53

## 2020-01-11 NOTE — ED NOTES
"Pt reports \"the pain is there but I don't care.\" Pt sleeping on gurney with  at bedside. Call light within reach.   "

## 2020-01-11 NOTE — ED TRIAGE NOTES
Ambulatory to triage with   Chief Complaint   Patient presents with   • RUQ Pain   • Flank Pain     Right   • Abnormal Labs     States was called 3 days ago by MD stating she had abnormal kidney function. Developed right flank pain last night. States Hx of a kidney stone seen on imaging, however does not remember which kidney. Denies fever or chills. States blood in urine at  prior to coming here.     Educated regarding triage process. Will notify staff of any changes in condition.

## 2020-01-11 NOTE — ED PROVIDER NOTES
ED Provider Note    HPI: Patient is a 59-year-old female who presented to the emergency department January 10, 2020 at 6:35 PM with a chief complaint of right-sided abdominal pain.    Patient has pain in the right flank area really about just above the umbilicus.  The patient has had some nausea but no no vomiting.  No fever no chills no cough.  Pain began yesterday.  It was relatively acute in onset.  The pain is not positional in nature.  Patient has had diarrhea.  No chest pain no shortness of breath.  No fever no chills no cough.  No other somatic complaints.      Review of Systems: Positive right flank pain negative for chest pain shortness of breath vomiting diarrhea fever chills cough.  Review of systems reviewed with patient, all other systems negative    Past medical/surgical history: Sarcoid kidney stone chickenpox cholecystectomy cervical fusion    Medications: Prednisone Remicade Plaquenil    Allergies: Fish shellfish environmental allergies epinephrine latex    Social History: Patient does not smoke no alcohol use      Physical exam: Constitutional: Pleasant female awake alert appears somewhat uncomfortable  Vital signs: Temperature 97.7 pulse 76 respirations 14 blood pressure 133/85 pulse oximetry 98%  EYES: PERRL, EOMI, Conjunctivae and sclera normal, eyelids normal bilaterally.  Neck: Trachea midline. No cervical masses seen or palpated. Normal range of motion, supple. No meningeal signs elicited.  Cardiac: Regular rate and rhythm. S1-S2 present. No S3 or S4 present. No murmurs, rubs, or gallops heard. No edema or varicosities were seen.   Lungs: Clear to auscultation with good aeration throughout. No wheezes, rales, or rhonchi heard. Patient's chest wall moved symmetrically with each respiratory effort. Patient was not making use of accessory muscles of respiration in breathing.  Abdomen: Soft nontender to palpation.  Subjective pain in the right flank area does not increase with palpation.  No  rebound or guarding elicited. No organomegaly identified. No pulsatile abdominal masses identified.   Musculoskeletal:  no  pain with palpitation or movement of muscle, bone or joint , no obvious musculoskeletal deformities identified.  Neurologic: alert and awake answers questions appropriately. Moves all four extremities independently, no gross focal abnormalities identified. Normal strength and motor.  Skin: no rash or lesion seen, no palpable dermatologic lesions identified.  Psychiatric: Patient appeared to be slightly anxious but not inappropriately so.  She was not delusional or hallucinating    Medical decision making: Laboratory studies obtained (please see lab sheet for all results) significant findings included remarkable CBC.  Chemistry panel showed slight dehydration with a BUN of 23.  Urinalysis was essentially unremarkable except for small amount of blood that could indicate a kidney stone    CT  scan of the abdomen with IV contrast obtained; no acute abnormalities were seen.  Some mild intrahepatic and common bile duct dilatation noted which was felt to be likely postcholecystectomy although had there been any evidence of biliary obstruction further work-up would be indicated.  However, the patient's laboratory results were essentially unremarkable    I discussed long discussion with the patient's presentation.  The patient apparently underwent some antibiotic treatment for root canals and had to discontinue her general Remicade dosing until recently.  This would seem to be a possible cause for her pain.  Another possibility would be a kidney stone which she is passed.  The patient does not appear to be systemically ill or toxic.  Her blood work is reassuring.  The patient very much wished to be discharged.  She will be discharged on prescription for Norco and Zofran.  The patient is to push fluids and rest.  She is to call her primary care provider tomorrow for follow-up.  The patient is carefully  counseled return to ED for exam pain fever vomiting or any other problems.    Patient given discharge instructions for abdominal pain.  Patient understands very important for her to return to the progressing pain fever vomiting or any other problems.    I reviewed prescription monitoring program for patient's narcotic use before prescribing a scheduled drug.The patient will not drink alcohol nor drive with prescribed medications. The patient will return for new or worsening symptoms and is stable at the time of discharge.        In prescribing controlled substances to this patient, I certify that I have obtained and reviewed the medical history of Alesia Lewis. I have also made a good shena effort to obtain applicable records from other providers who have treated the patient and records did not demonstrate any increased risk of substance abuse that would prevent me from prescribing controlled substances.      I have conducted a physical exam and documented it. I have reviewed  Ms. Lewis's prescription history as maintained by the [place] Prescription Monitoring Program.      I have assessed the patient’s risk for abuse, dependency, and addiction using the validated Opioid Risk Tool available at https://www.mdcalc.com/jjcfrr-yehm-axjo-ort-narcotic-abuse.      Given the above, I believe the benefits of controlled substance therapy outweigh the risks. The reasons for prescribing controlled substances include in my professional opinion, controlled substances are the only reasonable choice for this patientAccordingly, I have discussed the risk and benefits, treatment plan, and alternative therapies with the patient.       Impression abdominal pain cause uncertain

## 2020-01-11 NOTE — ED NOTES
Pt see's Rheumatologist for Sarcoid, receiving Remicade infusions, takes Prednisone too. Pt C/O R flank pain, denies vomiting but feels nauseated. Pain 6/10 no dysuria.

## 2020-07-31 PROBLEM — D86.9 SARCOIDOSIS: Status: ACTIVE | Noted: 2020-07-31

## 2020-10-19 ENCOUNTER — TELEPHONE (OUTPATIENT)
Dept: SCHEDULING | Facility: IMAGING CENTER | Age: 60
End: 2020-10-19

## 2020-10-23 ENCOUNTER — OFFICE VISIT (OUTPATIENT)
Dept: MEDICAL GROUP | Facility: PHYSICIAN GROUP | Age: 60
End: 2020-10-23
Payer: COMMERCIAL

## 2020-10-23 VITALS
DIASTOLIC BLOOD PRESSURE: 60 MMHG | HEART RATE: 84 BPM | HEIGHT: 66 IN | OXYGEN SATURATION: 94 % | RESPIRATION RATE: 16 BRPM | SYSTOLIC BLOOD PRESSURE: 100 MMHG | BODY MASS INDEX: 26.58 KG/M2 | WEIGHT: 165.4 LBS | TEMPERATURE: 98.5 F

## 2020-10-23 DIAGNOSIS — Z12.31 ENCOUNTER FOR SCREENING MAMMOGRAM FOR MALIGNANT NEOPLASM OF BREAST: ICD-10-CM

## 2020-10-23 DIAGNOSIS — M65.4 DE QUERVAIN'S TENOSYNOVITIS, RIGHT: ICD-10-CM

## 2020-10-23 DIAGNOSIS — D86.9 SARCOIDOSIS: Primary | ICD-10-CM

## 2020-10-23 DIAGNOSIS — N18.31 STAGE 3A CHRONIC KIDNEY DISEASE: ICD-10-CM

## 2020-10-23 DIAGNOSIS — Z23 NEED FOR VACCINATION: ICD-10-CM

## 2020-10-23 PROBLEM — N18.30 STAGE 3 CHRONIC KIDNEY DISEASE: Status: ACTIVE | Noted: 2020-10-23

## 2020-10-23 PROBLEM — M79.644 PAIN OF RIGHT THUMB: Status: ACTIVE | Noted: 2020-10-23

## 2020-10-23 PROBLEM — R93.89 ABNORMAL RADIOGRAPHIC EXAMINATION: Status: RESOLVED | Noted: 2017-05-18 | Resolved: 2020-10-23

## 2020-10-23 PROCEDURE — 90471 IMMUNIZATION ADMIN: CPT | Performed by: FAMILY MEDICINE

## 2020-10-23 PROCEDURE — 90686 IIV4 VACC NO PRSV 0.5 ML IM: CPT | Performed by: FAMILY MEDICINE

## 2020-10-23 PROCEDURE — 99204 OFFICE O/P NEW MOD 45 MIN: CPT | Mod: 25 | Performed by: FAMILY MEDICINE

## 2020-10-23 SDOH — ECONOMIC STABILITY: TRANSPORTATION INSECURITY
IN THE PAST 12 MONTHS, HAS LACK OF RELIABLE TRANSPORTATION KEPT YOU FROM MEDICAL APPOINTMENTS, MEETINGS, WORK OR FROM GETTING THINGS NEEDED FOR DAILY LIVING?: NO

## 2020-10-23 SDOH — ECONOMIC STABILITY: HOUSING INSECURITY

## 2020-10-23 SDOH — ECONOMIC STABILITY: HOUSING INSECURITY
IN THE LAST 12 MONTHS, WAS THERE A TIME WHEN YOU DID NOT HAVE A STEADY PLACE TO SLEEP OR SLEPT IN A SHELTER (INCLUDING NOW)?: NO

## 2020-10-23 SDOH — SOCIAL STABILITY: SOCIAL NETWORK: HOW OFTEN DO YOU ATTEND CHURCH OR RELIGIOUS SERVICES?: NEVER

## 2020-10-23 SDOH — SOCIAL STABILITY: SOCIAL NETWORK: ARE YOU MARRIED, WIDOWED, DIVORCED, SEPARATED, NEVER MARRIED, OR LIVING WITH A PARTNER?: MARRIED

## 2020-10-23 SDOH — ECONOMIC STABILITY: INCOME INSECURITY: HOW HARD IS IT FOR YOU TO PAY FOR THE VERY BASICS LIKE FOOD, HOUSING, MEDICAL CARE, AND HEATING?: NOT HARD AT ALL

## 2020-10-23 SDOH — SOCIAL STABILITY: SOCIAL NETWORK: IN A TYPICAL WEEK, HOW MANY TIMES DO YOU TALK ON THE PHONE WITH FAMILY, FRIENDS, OR NEIGHBORS?: TWICE A WEEK

## 2020-10-23 SDOH — ECONOMIC STABILITY: FOOD INSECURITY: WITHIN THE PAST 12 MONTHS, YOU WORRIED THAT YOUR FOOD WOULD RUN OUT BEFORE YOU GOT MONEY TO BUY MORE.: NEVER TRUE

## 2020-10-23 SDOH — HEALTH STABILITY: PHYSICAL HEALTH: ON AVERAGE, HOW MANY DAYS PER WEEK DO YOU ENGAGE IN MODERATE TO STRENUOUS EXERCISE (LIKE A BRISK WALK)?: 5 DAYS

## 2020-10-23 SDOH — SOCIAL STABILITY: SOCIAL NETWORK: HOW OFTEN DO YOU ATTENT MEETINGS OF THE CLUB OR ORGANIZATION YOU BELONG TO?: NEVER

## 2020-10-23 SDOH — ECONOMIC STABILITY: TRANSPORTATION INSECURITY
IN THE PAST 12 MONTHS, HAS LACK OF TRANSPORTATION KEPT YOU FROM MEETINGS, WORK, OR FROM GETTING THINGS NEEDED FOR DAILY LIVING?: NO

## 2020-10-23 SDOH — HEALTH STABILITY: MENTAL HEALTH
STRESS IS WHEN SOMEONE FEELS TENSE, NERVOUS, ANXIOUS, OR CAN'T SLEEP AT NIGHT BECAUSE THEIR MIND IS TROUBLED. HOW STRESSED ARE YOU?: TO SOME EXTENT

## 2020-10-23 SDOH — SOCIAL STABILITY: SOCIAL NETWORK
DO YOU BELONG TO ANY CLUBS OR ORGANIZATIONS SUCH AS CHURCH GROUPS UNIONS, FRATERNAL OR ATHLETIC GROUPS, OR SCHOOL GROUPS?: NO

## 2020-10-23 SDOH — HEALTH STABILITY: PHYSICAL HEALTH: ON AVERAGE, HOW MANY MINUTES DO YOU ENGAGE IN EXERCISE AT THIS LEVEL?: 10 MINUTES

## 2020-10-23 SDOH — ECONOMIC STABILITY: TRANSPORTATION INSECURITY
IN THE PAST 12 MONTHS, HAS THE LACK OF TRANSPORTATION KEPT YOU FROM MEDICAL APPOINTMENTS OR FROM GETTING MEDICATIONS?: NO

## 2020-10-23 SDOH — ECONOMIC STABILITY: INCOME INSECURITY: IN THE LAST 12 MONTHS, WAS THERE A TIME WHEN YOU WERE NOT ABLE TO PAY THE MORTGAGE OR RENT ON TIME?: NO

## 2020-10-23 SDOH — ECONOMIC STABILITY: FOOD INSECURITY: WITHIN THE PAST 12 MONTHS, THE FOOD YOU BOUGHT JUST DIDN'T LAST AND YOU DIDN'T HAVE MONEY TO GET MORE.: NEVER TRUE

## 2020-10-23 SDOH — HEALTH STABILITY: PHYSICAL HEALTH: ON AVERAGE, HOW MANY MINUTES DO YOU ENGAGE IN EXERCISE AT THIS LEVEL?: 10 MIN

## 2020-10-23 SDOH — HEALTH STABILITY: MENTAL HEALTH: HOW OFTEN DO YOU HAVE A DRINK CONTAINING ALCOHOL?: NEVER

## 2020-10-23 SDOH — SOCIAL STABILITY: SOCIAL NETWORK: HOW OFTEN DO YOU GET TOGETHER WITH FRIENDS OR RELATIVES?: NEVER

## 2020-10-23 ASSESSMENT — SOCIAL DETERMINANTS OF HEALTH (SDOH)
WITHIN THE PAST 12 MONTHS, YOU WORRIED THAT YOUR FOOD WOULD RUN OUT BEFORE YOU GOT THE MONEY TO BUY MORE: NEVER TRUE
HOW HARD IS IT FOR YOU TO PAY FOR THE VERY BASICS LIKE FOOD, HOUSING, MEDICAL CARE, AND HEATING?: NOT HARD AT ALL
HOW OFTEN DO YOU ATTENT MEETINGS OF THE CLUB OR ORGANIZATION YOU BELONG TO?: NEVER
HOW OFTEN DO YOU ATTEND CHURCH OR RELIGIOUS SERVICES?: NEVER
DO YOU BELONG TO ANY CLUBS OR ORGANIZATIONS SUCH AS CHURCH GROUPS UNIONS, FRATERNAL OR ATHLETIC GROUPS, OR SCHOOL GROUPS?: NO
HOW OFTEN DO YOU HAVE A DRINK CONTAINING ALCOHOL: NEVER
WITHIN THE PAST 12 MONTHS, THE FOOD YOU BOUGHT JUST DIDN'T LAST AND YOU DIDN'T HAVE MONEY TO GET MORE: NEVER TRUE
HOW OFTEN DO YOU GET TOGETHER WITH FRIENDS OR RELATIVES?: NEVER
IN A TYPICAL WEEK, HOW MANY TIMES DO YOU TALK ON THE PHONE WITH FAMILY, FRIENDS, OR NEIGHBORS?: TWICE A WEEK

## 2020-10-23 ASSESSMENT — PATIENT HEALTH QUESTIONNAIRE - PHQ9: CLINICAL INTERPRETATION OF PHQ2 SCORE: 0

## 2020-10-23 ASSESSMENT — FIBROSIS 4 INDEX: FIB4 SCORE: 1.04

## 2020-10-23 NOTE — PROGRESS NOTES
Subjective:     CC:  Diagnoses of Sarcoidosis, De Quervain's tenosynovitis, right, Stage 3a chronic kidney disease, Encounter for screening mammogram for malignant neoplasm of breast, and Need for vaccination were pertinent to this visit.    HISTORY OF THE PRESENT ILLNESS: Patient is a 59 y.o. female. This pleasant patient is here today to establish care and discuss thumb pain. Her prior PCP was in [place], [place].    Sarcoidosis  This is a chronic condition. This was diagnosed ~4 years ago. She follows with rheumatology, Dr. Steward, regularly. She is on hydroxychloroquine and prednisone daily and Remikade infusions every 6 weeks. She reports this is working pretty well. The Remikade infusions are becoming more frequent as symptoms return in the last week or so.     Pain of right thumb  This is a new condition.  Onset: 1 month  Location: CMC joint  Duration: intermittent  Quality: pain that shoots up the arm  Precipitating trauma: none  Associated symptoms: +weakness with pain  Home treatments: ice - no work, heat - no work      Stage 3 chronic kidney disease  This is a chronic condition.  Onset: 4/2019  EGFR: 42 (7/2020)  Albuminuria: due  Stage: 3a  Serum albumin: 3.8 (7/2020) - repeat 7/2021  Alk phos: 71 (7/2020) - no repeat necessary  Ca & Phos: Ca 8.2 (7/2020); Phos due  Vit D: 14.8 (5/2019)  PTH: due  Anemia: no anemia (7/2020)  Avoiding nephrotoxic agents? yes         Allergies: Epinephrine, Fish, Latex, Other misc, Shellfish allergy, Food, and Other environmental    Current Outpatient Medications Ordered in Epic   Medication Sig Dispense Refill   • Misc. Devices Misc Right sided thumb spica splint 1 Each 0   • predniSONE (DELTASONE) 1 MG Tab Take 3 Tabs by mouth every day. 270 Tab 1   • hydroxychloroquine (PLAQUENIL) 200 MG Tab Take 1.5 Tabs by mouth every day. 135 Tab 1   • Acetaminophen (TYLENOL PO) Take 2 Tabs by mouth as needed.       No current Epic-ordered facility-administered medications on file.   "      Past Medical History:   Diagnosis Date   • Anesthesia     \"had a problem one time\" Epinepherine causes palpatations   • Chickenpox    • Heart burn    • Kidney stone    • Mumps    • Pain 08-12-10    neck, shoulders, and arms, 10/10   • Sarcoidosis        Past Surgical History:   Procedure Laterality Date   • BRONCHOSCOPY  2017    Procedure: FIBEROPTIC BRONCHOSCOPY, BROCHOALVEOLAR LAVAGE WITH FINE NEEDLE ASPIRATION AND BIOSY, ENDOBRONCHIAL ULTRASOUND ;  Surgeon: Adonis Warner M.D.;  Location: SURGERY SAME DAY [facility];  Service:    • CERVICAL DISK AND FUSION ANTERIOR  2010    Performed by MUKUND MAYNARD at SURGERY [facility] ORS   • CERVICAL FUSION POSTERIOR  2010    Performed by MUKUND MAYNARD at SURGERY [facility]   • LUMPECTOMY Right     atypical cells, not cancer   • CHOLECYSTECTOMY     • PRIMARY C SECTION         • SINUSOTOMIES Right    • OTHER ORTHOPEDIC SURGERY       hand        Social History     Tobacco Use   • Smoking status: Never Smoker   • Smokeless tobacco: Never Used   Substance Use Topics   • Alcohol use: No     Frequency: Never   • Drug use: No       Social History     Social History Narrative   • Not on file       Family History   Problem Relation Age of Onset   • Asthma Mother    • Other Mother         Hep C   • Kidney Disease Mother         hepatorenal, s/p transplant   • Heart Disease Father 61        MI, stent   • Cancer Maternal Aunt         breast   • Heart Disease Maternal Grandmother    • Cancer Maternal Grandmother         leukemia   • Hypertension Paternal Grandfather    • Stroke Paternal Grandfather    • Cancer Maternal Aunt         breast   • Diabetes Neg Hx        Health Maintenance: Requesting records    ROS:   Gen: no fevers/chills  Eyes: no changes in vision  ENT: + hearing loss  Pulm: no sob  CV: + chest pain - sharp, left chest  GI: no diarrhea  : no dysuria  MSk: no myalgias  Skin: + rash - from sarcoid  Neuro: no " "numbness/tingling      Objective:     Exam: /60 (BP Location: Right arm, Patient Position: Sitting, BP Cuff Size: Adult)   Pulse 84   Temp 36.9 °C (98.5 °F) (Temporal)   Resp 16   Ht 1.676 m (5' 6\")   Wt 75 kg (165 lb 6.4 oz)   SpO2 94%  Body mass index is 26.7 kg/m².    General: Normal appearing. No distress.  HEENT: Normocephalic. Eyes conjunctiva clear lids without ptosis, pupils equal and reactive to light accommodation, ears normal shape and contour, canals are clear bilaterally, tympanic membranes are benign, oropharynx is without erythema, edema or exudates.   Neck: Supple without JVD. Thyroid is not enlarged.  Pulmonary: Clear to ausculation.  Normal effort. No rales, ronchi, or wheezing.  Cardiovascular: Regular rate and rhythm without murmur. Carotid and radial pulses are intact and equal bilaterally.  Abdomen: Soft, nontender, nondistended. Normal bowel sounds. Liver and spleen are not palpable  Neurologic: Grossly nonfocal  Lymph: No cervical or supraclavicular lymph nodes are palpable  Skin: Warm and dry.  No obvious lesions.  Musculoskeletal: Normal gait. No extremity cyanosis, clubbing, or edema.  Psych: Normal mood and affect. Alert and oriented x3. Judgment and insight is normal.    Assessment & Plan:   59 y.o. female with the following -    1. Sarcoidosis  This is a chronic condition, stable.  She was diagnosed with sarcoidosis approximately 4 years ago.  She follows with rheumatology regularly who is managing this condition.  She is on hydroxychloroquine and prednisone daily and is on Remicade infusions every 6 weeks.  She states she is to be on them every 8 weeks but symptoms would return within a couple weeks of the next dose so they have been slowly decreasing the interval between Remicade infusions to get her symptoms under better control.  -Continue hydroxychloroquine, prednisone, and Remicade    2. De Quervain's tenosynovitis, right  This is a new condition.  She has had pain " near the CMC joint of her right thumb for about a month intermittently.  It will shoot up into the arm and is worse with certain movements.  Possible there are some arthritis but Finkelstein's test was positive so there may be some de Quervain's tenosynovitis contributing.  We will treat with a thumb spica splint that she will use for activities aggravate the pain and we discussed that it can take several months to a year to resolve.  - Misc. Devices Misc; Right sided thumb spica splint  Dispense: 1 Each; Refill: 0    3. Stage 3a chronic kidney disease  This is a chronic condition, stable.  Labs in July, 2020 did show a GFR 42.  She has had chronic kidney disease since April, 2019.  We will get some lab work to reevaluate her kidney function and get all the other labs required for monitoring kidney disease.  - Renal Function Panel; Future  - URINE PROTEIN; Future  - VITAMIN D,25 HYDROXY; Future  - PTH INTACT (PTH ONLY); Future    4. Encounter for screening mammogram for malignant neoplasm of breast  She states she is not had any breast cancer screening a few years and she would like to get the ultrasound breast cancer screening, which was ordered.  - US-SCREENING WHOLE BREAST (3D SCREENING); Future    5. Need for vaccination  - Influenza Vaccine Quad Injection (PF)    Return in about 6 months (around 4/23/2021) for Annual/wellness visit.    Please note that this dictation was created using voice recognition software. I have made every reasonable attempt to correct obvious errors, but I expect that there are errors of grammar and possibly content that I did not discover before finalizing the note.

## 2020-10-23 NOTE — LETTER
[facility]  Opal Gilbert M.D.  [address]  [place] [address]  Fax: [phone]   Authorization for Release/Disclosure of   Protected Health Information   Name: OLIVE AGARWAL : 1960 SSN: [id number]   Address: [address] [place] [address] Phone:    [phone] (home)    I authorize the entity listed below to release/disclose the PHI below to:   [facility]/Opal Gilbert M.D. and Opal Gilbert M.D.   Provider or Entity Name:  [facility]   Address   City, State, [facility]   Phone:      Fax:     Reason for request: continuity of care   Information to be released:    [X] LAST COLONOSCOPY,  including any PATH REPORT and follow-up  [  ] LAST FIT/COLOGUARD RESULT [  ] LAST DEXA  [  ] LAST MAMMOGRAM  [  ] LAST PAP  [  ] LAST LABS [  ] RETINA EXAM REPORT  [  ] IMMUNIZATION RECORDS  [  ] Release all info      [  ] Check here and initial the line next to each item to release ALL health information INCLUDING  _____ Care and treatment for drug and / or alcohol abuse  _____ HIV testing, infection status, or AIDS  _____ Genetic Testing    DATES OF SERVICE OR TIME PERIOD TO BE DISCLOSED: _____________  I understand and acknowledge that:  * This Authorization may be revoked at any time by you in writing, except if your health information has already been used or disclosed.  * Your health information that will be used or disclosed as a result of you signing this authorization could be re-disclosed by the recipient. If this occurs, your re-disclosed health information may no longer be protected by State or Federal laws.  * You may refuse to sign this Authorization. Your refusal will not affect your ability to obtain treatment.  * This Authorization becomes effective upon signing and will  on (date) __________.      If no date is indicated, this Authorization will  one (1) year from the signature date.    Name: Olive Agarwal    Signature:   Date:     10/23/2020            PLEASE FAX REQUESTED RECORDS BACK TO: [phone]

## 2020-10-23 NOTE — LETTER
[facility]  Opal Gilbert M.D.  [address]  [place] [address]  Fax: [phone]   Authorization for Release/Disclosure of   Protected Health Information   Name: ALESIA AGARWAL : 1960 SSN: [id number]   Address: [address] [place], [place] [id number] Phone:    [phone] (home)    I authorize the entity listed below to release/disclose the PHI below to:   [facility]/Opal Gilbert M.D. and Opal Gilbert M.D.   Provider or Entity Name:  [place], [place]   Address   City, State, [facility]   Phone:      Fax:     Reason for request: continuity of care   Information to be released:    [  ] LAST COLONOSCOPY,  including any PATH REPORT and follow-up  [  ] LAST FIT/COLOGUARD RESULT [  ] LAST DEXA  [X] LAST MAMMOGRAM  [X] LAST PAP  [X] LAST LABS [  ] RETINA EXAM REPORT  [X] IMMUNIZATION RECORDS  [X] Release all info      [  ] Check here and initial the line next to each item to release ALL health information INCLUDING  _____ Care and treatment for drug and / or alcohol abuse  _____ HIV testing, infection status, or AIDS  _____ Genetic Testing    DATES OF SERVICE OR TIME PERIOD TO BE DISCLOSED: _____________  I understand and acknowledge that:  * This Authorization may be revoked at any time by you in writing, except if your health information has already been used or disclosed.  * Your health information that will be used or disclosed as a result of you signing this authorization could be re-disclosed by the recipient. If this occurs, your re-disclosed health information may no longer be protected by State or Federal laws.  * You may refuse to sign this Authorization. Your refusal will not affect your ability to obtain treatment.  * This Authorization becomes effective upon signing and will  on (date) __________.      If no date is indicated, this Authorization will  one (1) year from the signature date.    Name: Alesia Agarwal    Signature:   Date:     10/23/2020       PLEASE FAX REQUESTED RECORDS BACK TO: [phone]

## 2020-10-23 NOTE — ASSESSMENT & PLAN NOTE
This is a chronic condition.  Onset: 4/2019  EGFR: 42 (7/2020)  Albuminuria: due  Stage: 3a  Serum albumin: 3.8 (7/2020) - repeat 7/2021  Alk phos: 71 (7/2020) - no repeat necessary  Ca & Phos: Ca 8.2 (7/2020); Phos due  Vit D: 14.8 (5/2019)  PTH: due  Anemia: no anemia (7/2020)  Avoiding nephrotoxic agents? yes

## 2020-10-23 NOTE — ASSESSMENT & PLAN NOTE
This is a chronic condition. This was diagnosed ~4 years ago. She follows with rheumatology, Dr. Steward, regularly. She is on hydroxychloroquine and prednisone daily and Remikade infusions every 6 weeks. She reports this is working pretty well. The Remikade infusions are becoming more frequent as symptoms return in the last week or so.

## 2020-10-23 NOTE — LETTER
[facility]  Opal Gilbert M.D.  [address]  [place] [address]  Fax: [phone]   Authorization for Release/Disclosure of   Protected Health Information   Name: ALESIA AGARWAL : 1960 SSN: [id number]   Address: [address] [place] [address] Phone:    [phone] (home)    I authorize the entity listed below to release/disclose the PHI below to:   [facility]/Opal Gilbert M.D. and Opal Gilbert M.D.   Provider or Entity Name:  {[facility] COLORECTAL SCREENING LOCATIONS:[id number]}   Reason for request: continuity of care   Information to be released:    [ X ] LAST COLONOSCOPY,  including any PATH REPORT and follow-up  [ X ] LAST FIT/COLOGUARD RESULT [  ] LAST DEXA  [  ] LAST MAMMOGRAM  [  ] LAST PAP  [  ] LAST LABS [  ] RETINA EXAM REPORT  [  ] IMMUNIZATION RECORDS  [  ] Release all info      [  ] Check here and initial the line next to each item to release ALL health information INCLUDING  _____ Care and treatment for drug and / or alcohol abuse  _____ HIV testing, infection status, or AIDS  _____ Genetic Testing    DATES OF SERVICE OR TIME PERIOD TO BE DISCLOSED: _____________  I understand and acknowledge that:  * This Authorization may be revoked at any time by you in writing, except if your health information has already been used or disclosed.  * Your health information that will be used or disclosed as a result of you signing this authorization could be re-disclosed by the recipient. If this occurs, your re-disclosed health information may no longer be protected by State or Federal laws.  * You may refuse to sign this Authorization. Your refusal will not affect your ability to obtain treatment.  * This Authorization becomes effective upon signing and will  on (date) __________.      If no date is indicated, this Authorization will  one (1) year from the signature date.    Name: Alesia Agarwal    Signature:   Date:     10/23/2020       PLEASE FAX REQUESTED  RECORDS BACK TO: [phone]

## 2020-10-23 NOTE — ASSESSMENT & PLAN NOTE
This is a new condition.  Onset: 1 month  Location: R [facility] joint  Duration: intermittent  Quality: pain that shoots up the arm  Precipitating trauma: none  Associated symptoms: +weakness with pain  Home treatments: ice - no work, heat - no work

## 2020-12-28 ENCOUNTER — HOSPITAL ENCOUNTER (OUTPATIENT)
Dept: LAB | Facility: MEDICAL CENTER | Age: 60
End: 2020-12-28
Attending: FAMILY MEDICINE
Payer: COMMERCIAL

## 2020-12-28 LAB — COVID ORDER STATUS COVID19: NORMAL

## 2020-12-28 PROCEDURE — U0003 INFECTIOUS AGENT DETECTION BY NUCLEIC ACID (DNA OR RNA); SEVERE ACUTE RESPIRATORY SYNDROME CORONAVIRUS 2 (SARS-COV-2) (CORONAVIRUS DISEASE [COVID-19]), AMPLIFIED PROBE TECHNIQUE, MAKING USE OF HIGH THROUGHPUT TECHNOLOGIES AS DESCRIBED BY CMS-2020-01-R: HCPCS

## 2020-12-29 ENCOUNTER — PATIENT MESSAGE (OUTPATIENT)
Dept: MEDICAL GROUP | Facility: PHYSICIAN GROUP | Age: 60
End: 2020-12-29

## 2020-12-29 ENCOUNTER — TELEPHONE (OUTPATIENT)
Dept: ONCOLOGY | Facility: MEDICAL CENTER | Age: 60
End: 2020-12-29

## 2020-12-29 ENCOUNTER — TELEPHONE (OUTPATIENT)
Dept: MEDICAL GROUP | Facility: PHYSICIAN GROUP | Age: 60
End: 2020-12-29

## 2020-12-29 DIAGNOSIS — U07.1 COVID-19 IN IMMUNOCOMPROMISED PATIENT (HCC): ICD-10-CM

## 2020-12-29 DIAGNOSIS — D84.9 COVID-19 IN IMMUNOCOMPROMISED PATIENT (HCC): ICD-10-CM

## 2020-12-29 DIAGNOSIS — U07.1 COVID-19: ICD-10-CM

## 2020-12-29 LAB
SARS-COV-2 RNA RESP QL NAA+PROBE: DETECTED
SPECIMEN SOURCE: ABNORMAL

## 2020-12-29 NOTE — TELEPHONE ENCOUNTER
Communicated with patient regarding her upcoming appointment in [facility] 12/30 at 1300. Reviewed check-in process for her appointment, provided Infusion  telephone number, reviewed visitor policy and mask policy for patients coming to [facility]. Confirmed appointment time with patient, answered questions and concerns to patient satisfaction. Patient questioned about whether or not she should take her prednisone prior to her appointment as usual and her hydroxychlorquine which is due at bed time. Referred to pharmacy for their guidance. Will MyChart message patient with response.

## 2020-12-29 NOTE — TELEPHONE ENCOUNTER
I spoke with the patient regarding her COVID-19 positivity.  Her symptoms started 6 days ago (12/23/2020). She has the following symptoms - headache, arthralgia, myalgia, cough, rhinorrhea, congestion, sore throat, back pain (on fire).     Positive COVID-19 on 12/28/2020.    She does have a history of sarcoidosis and chronic kidney disease.  She is on chronic prednisone for immunosuppressive therapy for the sarcoidosis.  Chronic prednisone use and CKD would put her at high risk for progressing to severe COVID-19 and/or hospitalization.  He is not currently hospitalized, is not on oxygen therapy either for the COVID-19 or other chronic illness.  Therefore, I did discuss giving her the one-time dose of bamlanivimab and she would like to proceed.     Infusion order form was filled out and faxed over shortly after this encounter.

## 2020-12-29 NOTE — TELEPHONE ENCOUNTER
"I spoke with the patient regarding her COVID-19 positivity.  Her symptoms started 6 days ago (12/23/2020). She has the following symptoms - headache, arthralgia, myalgia, cough, rhinorrhea, congestion, sore throat, back pain (on fire).      Positive COVID-19 on 12/28/2020.     She does have a history of sarcoidosis and chronic kidney disease.  She is on chronic prednisone for immunosuppressive therapy for the sarcoidosis.  Chronic prednisone use and CKD would put her at high risk for progressing to severe COVID-19 and/or hospitalization.  She is not currently hospitalized, is not on oxygen therapy either for the COVID-19 or other chronic illness.      PRIOR TO administering Bamlanivimab, I informed the patient or parent/caregiver of the following information:   · I provided them the  \"Fact Sheet for Patients and Parents/Caregivers\"    · I informed them of therapeutic alternatives to Bamlanivimab and the risks and benefits of those alternatives   · I informed them that they have the option to accept or refuse Bamlanivimab,   · I informed them that Bamlanivimab is not FDA approved, but that it is authorized for use under emergency by the FDA   · I informed them of the known risks and benefits of Bamlanivimab and discussed the extent to which such risks and benefits are unknown      After reviewing all of this, she would like to proceed with the infusion.    Infusion order form was filled out and faxed over shortly after this encounter.  "

## 2020-12-30 ENCOUNTER — TELEPHONE (OUTPATIENT)
Dept: MEDICAL GROUP | Facility: PHYSICIAN GROUP | Age: 60
End: 2020-12-30

## 2020-12-30 ENCOUNTER — OUTPATIENT INFUSION SERVICES (OUTPATIENT)
Dept: ONCOLOGY | Facility: MEDICAL CENTER | Age: 60
End: 2020-12-30
Attending: FAMILY MEDICINE
Payer: COMMERCIAL

## 2020-12-30 VITALS
DIASTOLIC BLOOD PRESSURE: 75 MMHG | WEIGHT: 162.04 LBS | SYSTOLIC BLOOD PRESSURE: 114 MMHG | HEART RATE: 73 BPM | TEMPERATURE: 97.9 F | RESPIRATION RATE: 18 BRPM | HEIGHT: 67 IN | OXYGEN SATURATION: 98 % | BODY MASS INDEX: 25.43 KG/M2

## 2020-12-30 DIAGNOSIS — U07.1 COVID-19: ICD-10-CM

## 2020-12-30 DIAGNOSIS — U07.1 COVID-19 IN IMMUNOCOMPROMISED PATIENT (HCC): ICD-10-CM

## 2020-12-30 DIAGNOSIS — D84.9 COVID-19 IN IMMUNOCOMPROMISED PATIENT (HCC): ICD-10-CM

## 2020-12-30 PROCEDURE — 306637 HCHG MISC ORTHO ITEM RC 0274

## 2020-12-30 PROCEDURE — 96365 THER/PROPH/DIAG IV INF INIT: CPT

## 2020-12-30 PROCEDURE — 700111 HCHG RX REV CODE 636 W/ 250 OVERRIDE (IP): Performed by: FAMILY MEDICINE

## 2020-12-30 PROCEDURE — 306780 HCHG STAT FOR TRANSFUSION PER CASE

## 2020-12-30 PROCEDURE — 700105 HCHG RX REV CODE 258: Performed by: FAMILY MEDICINE

## 2020-12-30 PROCEDURE — M0239 BAMLANIVIMAB-XXXX INFUSION: HCPCS

## 2020-12-30 RX ADMIN — SODIUM CHLORIDE: 9 INJECTION, SOLUTION INTRAVENOUS at 14:03

## 2020-12-30 ASSESSMENT — PAIN DESCRIPTION - PAIN TYPE: TYPE: ACUTE PAIN

## 2020-12-30 ASSESSMENT — FIBROSIS 4 INDEX: FIB4 SCORE: 0.91

## 2020-12-31 ENCOUNTER — TELEPHONE (OUTPATIENT)
Dept: MEDICAL GROUP | Facility: PHYSICIAN GROUP | Age: 60
End: 2020-12-31

## 2020-12-31 NOTE — PROGRESS NOTES
Patient to [facility] for BAM infusion. Patient stated that she was feeling achy all over and has a cough. Full PPE worn at all times when in contact with patient. PIV inserted into left forearm, flushed with + blood return. BAM infused with no s/s of infusion reaction. 1 hour obs performed. Patient stated that she feels good. PIV flushed with + blood return and removed. Gauze and coban applied as a dressing. Patient to home in care of self.

## 2020-12-31 NOTE — TELEPHONE ENCOUNTER
I called the patient to check in with her to see how the bamlanivimab infusion went.  She states she was just driving home from there so she was there for quite a while for the infusion and for the observation following it.  She states she tolerated the infusion very well.  I will continue to check in with her daily.

## 2021-01-05 ENCOUNTER — TELEPHONE (OUTPATIENT)
Dept: MEDICAL GROUP | Facility: PHYSICIAN GROUP | Age: 61
End: 2021-01-05

## 2021-01-06 NOTE — TELEPHONE ENCOUNTER
I called her today to check in. She states she continues to feel a little under the weather, but much better overall. She is sleeping better.

## 2021-04-23 ENCOUNTER — APPOINTMENT (OUTPATIENT)
Dept: MEDICAL GROUP | Facility: PHYSICIAN GROUP | Age: 61
End: 2021-04-23
Payer: COMMERCIAL

## 2021-04-29 ENCOUNTER — TELEMEDICINE (OUTPATIENT)
Dept: MEDICAL GROUP | Facility: PHYSICIAN GROUP | Age: 61
End: 2021-04-29
Payer: COMMERCIAL

## 2021-04-29 VITALS — BODY MASS INDEX: 25.71 KG/M2 | WEIGHT: 160 LBS | HEIGHT: 66 IN

## 2021-04-29 DIAGNOSIS — D86.9 SARCOIDOSIS: ICD-10-CM

## 2021-04-29 DIAGNOSIS — Z02.89 ENCOUNTER FOR COMPLETION OF FORM WITH PATIENT: ICD-10-CM

## 2021-04-29 PROBLEM — U07.1 COVID-19: Status: RESOLVED | Noted: 2020-12-29 | Resolved: 2021-04-29

## 2021-04-29 PROBLEM — Z86.16 HISTORY OF COVID-19: Status: ACTIVE | Noted: 2020-12-29

## 2021-04-29 PROCEDURE — 7101 PR PHYSICAL: Mod: 95,CR | Performed by: FAMILY MEDICINE

## 2021-04-29 RX ORDER — INFLIXIMAB 100 MG/10ML
INJECTION, POWDER, LYOPHILIZED, FOR SOLUTION INTRAVENOUS
COMMUNITY

## 2021-04-29 ASSESSMENT — PATIENT HEALTH QUESTIONNAIRE - PHQ9: CLINICAL INTERPRETATION OF PHQ2 SCORE: 0

## 2021-04-29 ASSESSMENT — FIBROSIS 4 INDEX: FIB4 SCORE: 0.8

## 2021-04-29 NOTE — PROGRESS NOTES
Virtual Visit: Established Patient   This visit was conducted via Zoom using secure and encrypted videoconferencing technology. The patient was in a private location in the [facility].    The patient's identity was confirmed and verbal consent was obtained for this virtual visit.    Subjective:   CC:   Chief Complaint   Patient presents with   • Paperwork     FMLA       Alesia Lewis is a 60 y.o. female presenting for evaluation and management of:    FMLA paperwork  She reports her Sarcoid has been getting uncontrolled. She doesn't want to go higher on the prednisone dose. She is on Remicade every 5 weeks, plaquenil daily, and prednisone. She has rashes all over her body and face. She gets burning of her face. 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 Her job entails regular security checks and the walking can fatigue her. She cannot run or tackle without shortness of breath or pain. She has to have this form filled out yearly to accommodate time off for appointments and flares.    ROS   Denies any recent fevers or chills. No chest pains .     Allergies   Allergen Reactions   • Epinephrine Rash and Palpitations     .   • Fish Anaphylaxis   • Latex Rash     .     • Other Misc Swelling     Tightness of the throat and systemic swelling    • Shellfish Allergy Anaphylaxis   • Food Hives     Tomatoes and walnuts cause blisters in mouth     • Other Environmental Rash     Perfums, bleach, soaps  [facility]       Current medicines (including changes today)  Current Outpatient Medications   Medication Sig Dispense Refill   • inFLIXimab (REMICADE) 100 MG Recon Soln Infuse  into a venous catheter. Every 5 weeks     • hydroxychloroquine (PLAQUENIL) 200 MG Tab Take 1.5 Tablets by mouth every day. 135 tablet 1   • Misc. Devices Misc Right sided thumb spica splint 1 Each 0   • predniSONE (DELTASONE) 1 MG Tab Take 3 Tabs by mouth every day. (Patient taking differently: Take 1 mg by  "mouth every day.) 270 Tab 1   • Acetaminophen (TYLENOL PO) Take 2 Tabs by mouth as needed.       No current facility-administered medications for this visit.       Patient Active Problem List    Diagnosis Date Noted   • History of COVID-19 12/29/2020   • Pain of right thumb 10/23/2020   • Stage 3 chronic kidney disease 10/23/2020   • Sarcoidosis 07/31/2020       Family History   Problem Relation Age of Onset   • Asthma Mother    • Other Mother         Hep C   • Kidney Disease Mother         hepatorenal, s/p transplant   • Heart Disease Father 61        MI, stent   • Cancer Maternal Aunt         breast   • Heart Disease Maternal Grandmother    • Cancer Maternal Grandmother         leukemia   • Hypertension Paternal Grandfather    • Stroke Paternal Grandfather    • Cancer Maternal Aunt         breast   • Diabetes Neg Hx        She  has a past medical history of Anesthesia, Chickenpox, Heart burn, Kidney stone, Mumps, Pain (08-12-10), and Sarcoidosis.  She  has a past surgical history that includes other orthopedic surgery (1982); cholecystectomy (1999); bronchoscopy (5/18/2017); primary c section (1998); cervical disk and fusion anterior (8/24/2010); cervical fusion posterior (8/24/2010); sinusotomies (Right, 1984); and lumpectomy (Right, 2001).       Objective:   Ht 1.676 m (5' 6\")   Wt 72.6 kg (160 lb)   LMP 07/01/2010   BMI 25.82 kg/m²  RR: 12    Physical Exam:  Constitutional: Alert, no distress, well-groomed.  Skin: No rashes in visible areas.  Eye: Round. Conjunctiva clear, lids normal. No icterus.   ENMT: Lips pink without lesions, good dentition, moist mucous membranes. Phonation normal.  Neck: No masses, no thyromegaly. Moves freely without pain.  Respiratory: Unlabored respiratory effort, no cough or audible wheeze  Psych: Alert and oriented x3, normal affect and mood.       Assessment and Plan:   The following treatment plan was discussed:     1. Encounter for completion of form with patient  2. " Sarcoidosis  She made this appointment today to have [facility] paperwork filled out.  She has had it filled out previously but her employer recently informed her has to be updated over a year.  She is getting IV infusions every 5 weeks for her sarcoidosis and follows up with her rheumatologist every 3 months.  She needs time off to accommodate for those appointments.  Additionally she will get occasional flares of significant body pain, diffuse rashes, and shortness of breath and she needs time off for those flares as well.  Evidently paperwork was filled out today, scanned into the chart.  She will get ahold of us later with the fax number and then we will fax it on her behalf.    Follow-up: Return if symptoms worsen or fail to improve.

## 2022-03-25 ENCOUNTER — OFFICE VISIT (OUTPATIENT)
Dept: MEDICAL GROUP | Facility: PHYSICIAN GROUP | Age: 62
End: 2022-03-25
Payer: COMMERCIAL

## 2022-03-25 VITALS
SYSTOLIC BLOOD PRESSURE: 110 MMHG | HEART RATE: 70 BPM | DIASTOLIC BLOOD PRESSURE: 60 MMHG | TEMPERATURE: 98.9 F | BODY MASS INDEX: 25.2 KG/M2 | OXYGEN SATURATION: 97 % | WEIGHT: 156.8 LBS | RESPIRATION RATE: 16 BRPM | HEIGHT: 66 IN

## 2022-03-25 DIAGNOSIS — R92.2 DENSE BREASTS: ICD-10-CM

## 2022-03-25 DIAGNOSIS — Z12.31 ENCOUNTER FOR SCREENING MAMMOGRAM FOR MALIGNANT NEOPLASM OF BREAST: ICD-10-CM

## 2022-03-25 DIAGNOSIS — M65.4 DE QUERVAIN'S TENOSYNOVITIS, RIGHT: Primary | ICD-10-CM

## 2022-03-25 DIAGNOSIS — R92.30 DENSE BREASTS: ICD-10-CM

## 2022-03-25 DIAGNOSIS — D86.9 SARCOIDOSIS: ICD-10-CM

## 2022-03-25 DIAGNOSIS — N18.31 STAGE 3A CHRONIC KIDNEY DISEASE: ICD-10-CM

## 2022-03-25 PROCEDURE — 99213 OFFICE O/P EST LOW 20 MIN: CPT | Performed by: FAMILY MEDICINE

## 2022-03-25 ASSESSMENT — PATIENT HEALTH QUESTIONNAIRE - PHQ9: CLINICAL INTERPRETATION OF PHQ2 SCORE: 0

## 2022-03-25 ASSESSMENT — ENCOUNTER SYMPTOMS
FEVER: 0
NAUSEA: 0
CHILLS: 0
VOMITING: 0

## 2022-03-25 ASSESSMENT — FIBROSIS 4 INDEX: FIB4 SCORE: 0.67

## 2022-03-25 NOTE — ASSESSMENT & PLAN NOTE
Chronic, stable.  She was diagnosed in 2016.  Currently on hydroxychloroquine and Remicade.  She will continue to follow dermatology who is monitoring and managing this condition.

## 2022-03-25 NOTE — PROGRESS NOTES
"Subjective:     CC: f/u thumb pain    HPI:   Alesia presents today with    Problem   De Quervain's Tenosynovitis, Right    Chronic. Diagnosed with DeQuervain's tenosynovitis in 10/2020. Mostly gone, but will still wear thumb spica wrist brace if bad days.      Stage 3 Chronic Kidney Disease (Hcc)    This is a chronic condition.  Onset: 4/2019  EGFR: 46 (1/2022)  Albuminuria: due  Stage: 3a  Serum albumin: 4.0 (1/2022) - repeat 1/2023  Alk phos: 71 (7/2020) - no repeat necessary  Ca: 9.1 (1/2022)  Vit D: 17 (10/2020)  PTH: due  Anemia: 14.2/42.5 (1/2022)  Avoiding nephrotoxic agents? yes      Sarcoidosis    Chronic. Diagnosed ~2016  Rheumatologist: Dr. Steward  Current meds: hydroxychloroquine 300 mg daily, Remicade 100 mg infused every 6 weeks    Reports she is now off the prednisone, off last 3-4 months. Is going to get CXR to re-assess her sarcoidosis. Remicade helps with her joints but still struggles with muscle pain. She always feels short of breath.          Health Maintenance: requesting records    ROS:  Review of Systems   Constitutional: Negative for chills and fever.   Gastrointestinal: Negative for nausea and vomiting.   Genitourinary: Positive for frequency. Negative for dysuria.       Objective:     Exam:  /60 (BP Location: Right arm, Patient Position: Sitting, BP Cuff Size: Adult)   Pulse 70   Temp 37.2 °C (98.9 °F) (Temporal)   Resp 16   Ht 1.676 m (5' 6\")   Wt 71.1 kg (156 lb 12.8 oz)   LMP 07/01/2010   SpO2 97%   BMI 25.31 kg/m²  Body mass index is 25.31 kg/m².    Physical Exam  Constitutional:       Appearance: Normal appearance.   Cardiovascular:      Rate and Rhythm: Normal rate and regular rhythm.      Heart sounds: Normal heart sounds.   Pulmonary:      Effort: Pulmonary effort is normal.      Breath sounds: Normal breath sounds.   Musculoskeletal:      Cervical back: Normal range of motion and neck supple.   Neurological:      Mental Status: She is alert.         Assessment & " Plan:     61 y.o. female with the following -     Problem List Items Addressed This Visit     Sarcoidosis     Chronic, stable.  She was diagnosed in 2016.  Currently on hydroxychloroquine and Remicade.  She will continue to follow dermatology who is monitoring and managing this condition.         De Quervain's tenosynovitis, right     Chronic, stable.  Initially diagnosed in October, 2021.  She reports that it is mostly resolved and still uses a thumb spica wrist brace as needed for bad days.  She can continue using that brace as needed.         Stage 3 chronic kidney disease (HCC)     Chronic, stable.  She was diagnosed with chronic kidney disease in April, 2019.  She is largely up-to-date with labs though due for urine testing, PTH, and vitamin D.  These labs have been ordered.  Additionally, she is been referred to nephrology and I provided the phone number for her today so she can establish with them.  We will continue to monitor labs regularly until she establishes with nephrology.         Relevant Orders    URINALYSIS,CULTURE IF INDICATED    PROTEIN/CREAT RATIO URINE    MICROALBUMIN CREAT RATIO URINE    VITAMIN D,25 HYDROXY    PTH INTACT (PTH ONLY)      Other Visit Diagnoses     Encounter for screening mammogram for malignant neoplasm of breast        Relevant Orders    MA-SCREENING MAMMO BILAT W/TOMOSYNTHESIS W/CAD    US-SCREENING WHOLE BREAST UNILATERAL (3D SCREENING)    Dense breasts        Relevant Orders    US-SCREENING WHOLE BREAST UNILATERAL (3D SCREENING)        Return in about 6 months (around 9/25/2022) for Annual/wellness visit.    Please note that this dictation was created using voice recognition software. I have made every reasonable attempt to correct obvious errors, but I expect that there are errors of grammar and possibly content that I did not discover before finalizing the note.

## 2022-03-25 NOTE — ASSESSMENT & PLAN NOTE
Chronic, stable.  Initially diagnosed in October, 2021.  She reports that it is mostly resolved and still uses a thumb spica wrist brace as needed for bad days.  She can continue using that brace as needed.

## 2022-03-25 NOTE — LETTER
[facility]  Opal Gilbert M.D.  [address]  [place] [address]  Fax: [phone]   Authorization for Release/Disclosure of   Protected Health Information   Name: ALESIA AGARWAL : 1960 SSN: [id number]   Address: [address] [place] [address] Phone:    [phone] (home) [phone] (work)   I authorize the entity listed below to release/disclose the PHI below to:   [facility]/Opal Gilbert M.D. and Opal Gilbert M.D.   Provider or Entity Name:  [facility]   Address   City, State, [facility]   Phone:      Fax:     Reason for request: continuity of care   Information to be released:    [XX] LAST COLONOSCOPY,  including any PATH REPORT and follow-up  [  ] LAST FIT/COLOGUARD RESULT [  ] LAST DEXA  [  ] LAST MAMMOGRAM  [  ] LAST PAP  [  ] LAST LABS [  ] RETINA EXAM REPORT  [  ] IMMUNIZATION RECORDS  [  ] Release all info      [  ] Check here and initial the line next to each item to release ALL health information INCLUDING  _____ Care and treatment for drug and / or alcohol abuse  _____ HIV testing, infection status, or AIDS  _____ Genetic Testing    DATES OF SERVICE OR TIME PERIOD TO BE DISCLOSED: _____________  I understand and acknowledge that:  * This Authorization may be revoked at any time by you in writing, except if your health information has already been used or disclosed.  * Your health information that will be used or disclosed as a result of you signing this authorization could be re-disclosed by the recipient. If this occurs, your re-disclosed health information may no longer be protected by State or Federal laws.  * You may refuse to sign this Authorization. Your refusal will not affect your ability to obtain treatment.  * This Authorization becomes effective upon signing and will  on (date) __________.      If no date is indicated, this Authorization will  one (1) year from the signature date.    Name: Alesia  Debbie    Signature:   Date:     3/25/2022       PLEASE FAX REQUESTED RECORDS BACK TO: [phone]

## 2022-03-25 NOTE — LETTER
[facility]  Opal Gilbert M.D.  [address]  [place] [address]  Fax: [phone]   Authorization for Release/Disclosure of   Protected Health Information   Name: ALESIA AGARWAL : 1960 SSN: [id number]   Address: [address] [place] [address] Phone:    [phone] (home) [phone] (work)   I authorize the entity listed below to release/disclose the PHI below to:   [facility]/Opal Gilbert M.D. and Opal Gilbert M.D.   Provider or Entity Name:  [place], [place]   Address   City, State, [facility]   Phone:      Fax:     Reason for request: continuity of care   Information to be released:    [  ] LAST COLONOSCOPY,  including any PATH REPORT and follow-up  [  ] LAST FIT/COLOGUARD RESULT [  ] LAST DEXA  [  ] LAST MAMMOGRAM  [  ] LAST PAP  [  ] LAST LABS [  ] RETINA EXAM REPORT  [XX] IMMUNIZATION RECORDS  [  ] Release all info      [  ] Check here and initial the line next to each item to release ALL health information INCLUDING  _____ Care and treatment for drug and / or alcohol abuse  _____ HIV testing, infection status, or AIDS  _____ Genetic Testing    DATES OF SERVICE OR TIME PERIOD TO BE DISCLOSED: _____________  I understand and acknowledge that:  * This Authorization may be revoked at any time by you in writing, except if your health information has already been used or disclosed.  * Your health information that will be used or disclosed as a result of you signing this authorization could be re-disclosed by the recipient. If this occurs, your re-disclosed health information may no longer be protected by State or Federal laws.  * You may refuse to sign this Authorization. Your refusal will not affect your ability to obtain treatment.  * This Authorization becomes effective upon signing and will  on (date) __________.      If no date is indicated, this Authorization will  one (1) year from the signature date.    Name: Alesia  Debbie    Signature:   Date:     3/25/2022       PLEASE FAX REQUESTED RECORDS BACK TO: [phone]

## 2022-03-25 NOTE — ASSESSMENT & PLAN NOTE
Chronic, stable.  She was diagnosed with chronic kidney disease in April, 2019.  She is largely up-to-date with labs though due for urine testing, PTH, and vitamin D.  These labs have been ordered.  Additionally, she is been referred to nephrology and I provided the phone number for her today so she can establish with them.  We will continue to monitor labs regularly until she establishes with nephrology.

## 2022-04-22 ENCOUNTER — OFFICE VISIT (OUTPATIENT)
Dept: NEPHROLOGY | Facility: MEDICAL CENTER | Age: 62
End: 2022-04-22
Payer: COMMERCIAL

## 2022-04-22 VITALS
OXYGEN SATURATION: 98 % | HEART RATE: 86 BPM | SYSTOLIC BLOOD PRESSURE: 118 MMHG | DIASTOLIC BLOOD PRESSURE: 72 MMHG | WEIGHT: 160 LBS | BODY MASS INDEX: 26.66 KG/M2 | HEIGHT: 65 IN | TEMPERATURE: 97.6 F

## 2022-04-22 DIAGNOSIS — D86.9 SARCOIDOSIS: ICD-10-CM

## 2022-04-22 DIAGNOSIS — E55.9 VITAMIN D DEFICIENCY: ICD-10-CM

## 2022-04-22 DIAGNOSIS — N32.81 OAB (OVERACTIVE BLADDER): ICD-10-CM

## 2022-04-22 DIAGNOSIS — N18.31 STAGE 3A CHRONIC KIDNEY DISEASE: ICD-10-CM

## 2022-04-22 PROCEDURE — 99204 OFFICE O/P NEW MOD 45 MIN: CPT | Performed by: INTERNAL MEDICINE

## 2022-04-22 RX ORDER — ERGOCALCIFEROL 1.25 MG/1
50000 CAPSULE ORAL
Qty: 12 CAPSULE | Refills: 0 | Status: SHIPPED | OUTPATIENT
Start: 2022-04-22 | End: 2022-09-19

## 2022-04-22 ASSESSMENT — FIBROSIS 4 INDEX: FIB4 SCORE: 1.09

## 2022-04-22 ASSESSMENT — ENCOUNTER SYMPTOMS
SHORTNESS OF BREATH: 1
FEVER: 0
ABDOMINAL PAIN: 0

## 2022-04-22 NOTE — PATIENT INSTRUCTIONS
Upon completion of 12-week course of vitamin D (ergocalciferol), I recommend taking over the counter vitamin D (Cholecalciferol, Vitamin D2 or D3, doesn't matter which) 125 mcg (which is the same as 5000 international units / IU) three times a week, such as on Monday Wednesday Friday. Alternatively, take cholecalciferol 2000 units daily if daily regimen is preferred.
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
## 2022-04-28 ENCOUNTER — HOSPITAL ENCOUNTER (OUTPATIENT)
Dept: RADIOLOGY | Facility: MEDICAL CENTER | Age: 62
End: 2022-04-28
Attending: FAMILY MEDICINE
Payer: COMMERCIAL

## 2022-04-28 ENCOUNTER — HOSPITAL ENCOUNTER (OUTPATIENT)
Dept: RADIOLOGY | Facility: MEDICAL CENTER | Age: 62
End: 2022-04-28
Attending: INTERNAL MEDICINE
Payer: COMMERCIAL

## 2022-04-28 DIAGNOSIS — R91.1 PULMONARY NODULE: ICD-10-CM

## 2022-04-28 DIAGNOSIS — D86.9 SARCOIDOSIS: ICD-10-CM

## 2022-04-28 DIAGNOSIS — Z12.31 ENCOUNTER FOR SCREENING MAMMOGRAM FOR MALIGNANT NEOPLASM OF BREAST: ICD-10-CM

## 2022-04-28 DIAGNOSIS — R92.30 DENSE BREASTS: ICD-10-CM

## 2022-04-28 DIAGNOSIS — R92.2 DENSE BREASTS: ICD-10-CM

## 2022-04-28 PROCEDURE — 71250 CT THORAX DX C-: CPT

## 2022-04-28 PROCEDURE — 77063 BREAST TOMOSYNTHESIS BI: CPT

## 2022-04-28 PROCEDURE — 76641 ULTRASOUND BREAST COMPLETE: CPT

## 2022-09-18 SDOH — HEALTH STABILITY: PHYSICAL HEALTH: ON AVERAGE, HOW MANY MINUTES DO YOU ENGAGE IN EXERCISE AT THIS LEVEL?: 10 MIN

## 2022-09-18 SDOH — ECONOMIC STABILITY: INCOME INSECURITY: IN THE LAST 12 MONTHS, WAS THERE A TIME WHEN YOU WERE NOT ABLE TO PAY THE MORTGAGE OR RENT ON TIME?: NO

## 2022-09-18 SDOH — ECONOMIC STABILITY: FOOD INSECURITY: WITHIN THE PAST 12 MONTHS, YOU WORRIED THAT YOUR FOOD WOULD RUN OUT BEFORE YOU GOT MONEY TO BUY MORE.: NEVER TRUE

## 2022-09-18 SDOH — ECONOMIC STABILITY: FOOD INSECURITY: WITHIN THE PAST 12 MONTHS, THE FOOD YOU BOUGHT JUST DIDN'T LAST AND YOU DIDN'T HAVE MONEY TO GET MORE.: NEVER TRUE

## 2022-09-18 SDOH — HEALTH STABILITY: PHYSICAL HEALTH: ON AVERAGE, HOW MANY DAYS PER WEEK DO YOU ENGAGE IN MODERATE TO STRENUOUS EXERCISE (LIKE A BRISK WALK)?: 2 DAYS

## 2022-09-18 SDOH — ECONOMIC STABILITY: INCOME INSECURITY: HOW HARD IS IT FOR YOU TO PAY FOR THE VERY BASICS LIKE FOOD, HOUSING, MEDICAL CARE, AND HEATING?: NOT VERY HARD

## 2022-09-18 SDOH — HEALTH STABILITY: MENTAL HEALTH
STRESS IS WHEN SOMEONE FEELS TENSE, NERVOUS, ANXIOUS, OR CAN'T SLEEP AT NIGHT BECAUSE THEIR MIND IS TROUBLED. HOW STRESSED ARE YOU?: ONLY A LITTLE

## 2022-09-18 SDOH — ECONOMIC STABILITY: HOUSING INSECURITY

## 2022-09-18 ASSESSMENT — SOCIAL DETERMINANTS OF HEALTH (SDOH)
HOW OFTEN DO YOU ATTENT MEETINGS OF THE CLUB OR ORGANIZATION YOU BELONG TO?: NEVER
WITHIN THE PAST 12 MONTHS, YOU WORRIED THAT YOUR FOOD WOULD RUN OUT BEFORE YOU GOT THE MONEY TO BUY MORE: NEVER TRUE
HOW HARD IS IT FOR YOU TO PAY FOR THE VERY BASICS LIKE FOOD, HOUSING, MEDICAL CARE, AND HEATING?: NOT VERY HARD
HOW OFTEN DO YOU ATTENT MEETINGS OF THE CLUB OR ORGANIZATION YOU BELONG TO?: NEVER
HOW OFTEN DO YOU HAVE A DRINK CONTAINING ALCOHOL: NEVER
DO YOU BELONG TO ANY CLUBS OR ORGANIZATIONS SUCH AS CHURCH GROUPS UNIONS, FRATERNAL OR ATHLETIC GROUPS, OR SCHOOL GROUPS?: NO
IN A TYPICAL WEEK, HOW MANY TIMES DO YOU TALK ON THE PHONE WITH FAMILY, FRIENDS, OR NEIGHBORS?: MORE THAN THREE TIMES A WEEK
HOW OFTEN DO YOU ATTEND CHURCH OR RELIGIOUS SERVICES?: NEVER
HOW OFTEN DO YOU GET TOGETHER WITH FRIENDS OR RELATIVES?: ONCE A WEEK
HOW OFTEN DO YOU GET TOGETHER WITH FRIENDS OR RELATIVES?: ONCE A WEEK
HOW OFTEN DO YOU ATTEND CHURCH OR RELIGIOUS SERVICES?: NEVER
IN A TYPICAL WEEK, HOW MANY TIMES DO YOU TALK ON THE PHONE WITH FAMILY, FRIENDS, OR NEIGHBORS?: MORE THAN THREE TIMES A WEEK
DO YOU BELONG TO ANY CLUBS OR ORGANIZATIONS SUCH AS CHURCH GROUPS UNIONS, FRATERNAL OR ATHLETIC GROUPS, OR SCHOOL GROUPS?: NO
HOW MANY DRINKS CONTAINING ALCOHOL DO YOU HAVE ON A TYPICAL DAY WHEN YOU ARE DRINKING: PATIENT DOES NOT DRINK
HOW OFTEN DO YOU HAVE SIX OR MORE DRINKS ON ONE OCCASION: NEVER

## 2022-09-18 ASSESSMENT — LIFESTYLE VARIABLES
HOW OFTEN DO YOU HAVE A DRINK CONTAINING ALCOHOL: NEVER
HOW OFTEN DO YOU HAVE SIX OR MORE DRINKS ON ONE OCCASION: NEVER
AUDIT-C TOTAL SCORE: 0
HOW MANY STANDARD DRINKS CONTAINING ALCOHOL DO YOU HAVE ON A TYPICAL DAY: PATIENT DOES NOT DRINK
SKIP TO QUESTIONS 9-10: 1

## 2022-09-19 ENCOUNTER — OFFICE VISIT (OUTPATIENT)
Dept: MEDICAL GROUP | Facility: PHYSICIAN GROUP | Age: 62
End: 2022-09-19
Payer: COMMERCIAL

## 2022-09-19 VITALS
TEMPERATURE: 97.7 F | OXYGEN SATURATION: 97 % | WEIGHT: 161.6 LBS | DIASTOLIC BLOOD PRESSURE: 60 MMHG | SYSTOLIC BLOOD PRESSURE: 108 MMHG | HEART RATE: 75 BPM | BODY MASS INDEX: 27.59 KG/M2 | HEIGHT: 64 IN

## 2022-09-19 DIAGNOSIS — Z23 NEED FOR VACCINATION: ICD-10-CM

## 2022-09-19 DIAGNOSIS — Z01.84 IMMUNITY STATUS TESTING: ICD-10-CM

## 2022-09-19 DIAGNOSIS — M79.641 PAIN OF RIGHT HAND: ICD-10-CM

## 2022-09-19 DIAGNOSIS — M65.4 DE QUERVAIN'S TENOSYNOVITIS, RIGHT: ICD-10-CM

## 2022-09-19 PROCEDURE — 90471 IMMUNIZATION ADMIN: CPT | Performed by: FAMILY MEDICINE

## 2022-09-19 PROCEDURE — 90715 TDAP VACCINE 7 YRS/> IM: CPT | Performed by: FAMILY MEDICINE

## 2022-09-19 PROCEDURE — 99213 OFFICE O/P EST LOW 20 MIN: CPT | Mod: 25 | Performed by: FAMILY MEDICINE

## 2022-09-19 RX ORDER — OXYBUTYNIN CHLORIDE 5 MG/1
5 TABLET ORAL 3 TIMES DAILY PRN
COMMUNITY
Start: 2022-09-13 | End: 2023-02-23

## 2022-09-19 RX ORDER — OXYBUTYNIN CHLORIDE 5 MG/1
TABLET ORAL
COMMUNITY
End: 2022-09-19

## 2022-09-19 ASSESSMENT — ENCOUNTER SYMPTOMS
FEVER: 0
SHORTNESS OF BREATH: 1
CHILLS: 0

## 2022-09-19 ASSESSMENT — FIBROSIS 4 INDEX: FIB4 SCORE: 1.01

## 2022-09-19 NOTE — PROGRESS NOTES
"Subjective:     CC: hand, wrist pain    HPI:   Alesia presents today with    Problem   De Quervain's Tenosynovitis, Right    Chronic. Diagnosed with DeQuervain's tenosynovitis in 10/2020. Mostly gone 6 on months ago, only used brace on bad days.     But now getting pain of back of wrist and along ulnar wrist, radiates into forearm. Cannot grab onto things, she will drop them. No numbness/tingling in finger tips. The back of hand/wrist - sharp pain. Touching over abductor pollicis longus area is hypersensitive, and hurts. Hurts too much to type. This has been going on for the last 3 months. Wrist brace is not helping anymore. No precipitating trauma.     Last day at work early June. Work entailed lots of computer work. Now that not using computer daily or as much, when chooses to use the computer, hurts way worse.          Health Maintenance: Completed    ROS:  Review of Systems   Constitutional:  Negative for chills and fever.   Respiratory:  Positive for shortness of breath.         Sarcoid   Cardiovascular:  Negative for chest pain.     Objective:     Exam:  /60 (BP Location: Left arm, Patient Position: Sitting, BP Cuff Size: Adult)   Pulse 75   Temp 36.5 °C (97.7 °F) (Temporal)   Ht 1.626 m (5' 4\")   Wt 73.3 kg (161 lb 9.6 oz)   LMP 07/01/2010   SpO2 97%   BMI 27.74 kg/m²  Body mass index is 27.74 kg/m².    Physical Exam  Constitutional:       Appearance: Normal appearance.   Cardiovascular:      Rate and Rhythm: Normal rate and regular rhythm.      Heart sounds: Normal heart sounds.   Pulmonary:      Effort: Pulmonary effort is normal.      Breath sounds: Normal breath sounds.   Musculoskeletal:      Cervical back: Normal range of motion and neck supple.      Comments: R Wrist/Hand: No deformity or bruising noted. +swelling wrist. Tenderness to palpation abductor pollicis longus, interosseous spaces, ulnar styloid. No tenderness at snuffbox. Range of motion intact. Strength and sensation intact.  " Good  strength. 2+ radial pulse.   Neurological:      Mental Status: She is alert.       Assessment & Plan:     61 y.o. female with the following -     1. De Quervain's tenosynovitis, right  Chronic, uncontrolled.  She has had de Quervain's tenosynovitis since October, 2020 at least.  She had been doing well with a thumb spica splint but reports last 3 months been progressively getting worse and she is quite tender when you palpate over that region.  We will send her to sports medicine for further evaluation and treatment.  - Referral to Sports Medicine    2. Pain of right hand  Chronic, uncontrolled.  For last 3 months she is also been getting pain in the interosseous spaces and over the ulnar styloid.  She does have pain when strength testing the lumbricals.  She cannot recall any precipitating trauma but now she is using that right hand as little as possible because using it is very painful, especially when on the computer.  We will send her to sports medicine for further evaluation and treatment.  - Referral to Sports Medicine    3. Immunity status testing  - HEP B SURFACE AB; Future    4. Need for vaccination  - TDAP VACCINE =>6YO IM    Referral for genetic research was offered. Patient declined.    Return in about 3 months (around 12/19/2022) for Annual/wellness visit, no pap.    Please note that this dictation was created using voice recognition software. I have made every reasonable attempt to correct obvious errors, but I expect that there are errors of grammar and possibly content that I did not discover before finalizing the note.

## 2022-10-04 ENCOUNTER — OFFICE VISIT (OUTPATIENT)
Dept: SPORTS MEDICINE | Facility: CLINIC | Age: 62
End: 2022-10-04
Payer: COMMERCIAL

## 2022-10-04 ENCOUNTER — APPOINTMENT (OUTPATIENT)
Dept: RADIOLOGY | Facility: IMAGING CENTER | Age: 62
End: 2022-10-04
Attending: FAMILY MEDICINE
Payer: COMMERCIAL

## 2022-10-04 VITALS
OXYGEN SATURATION: 98 % | TEMPERATURE: 98.3 F | RESPIRATION RATE: 16 BRPM | HEART RATE: 72 BPM | WEIGHT: 161.6 LBS | HEIGHT: 64 IN | BODY MASS INDEX: 27.59 KG/M2 | SYSTOLIC BLOOD PRESSURE: 118 MMHG | DIASTOLIC BLOOD PRESSURE: 78 MMHG

## 2022-10-04 DIAGNOSIS — M67.431 GANGLION CYST OF DORSUM OF RIGHT WRIST: ICD-10-CM

## 2022-10-04 DIAGNOSIS — M19.031 CMC DJD(CARPOMETACARPAL DEGENERATIVE JOINT DISEASE), LOCALIZED PRIMARY, RIGHT: ICD-10-CM

## 2022-10-04 DIAGNOSIS — M79.644 THUMB PAIN, RIGHT: ICD-10-CM

## 2022-10-04 PROCEDURE — 73130 X-RAY EXAM OF HAND: CPT | Mod: TC,RT | Performed by: FAMILY MEDICINE

## 2022-10-04 PROCEDURE — 99213 OFFICE O/P EST LOW 20 MIN: CPT | Performed by: FAMILY MEDICINE

## 2022-10-04 ASSESSMENT — ENCOUNTER SYMPTOMS
DIZZINESS: 0
CHILLS: 0
FEVER: 0
NAUSEA: 0
VOMITING: 0
SHORTNESS OF BREATH: 0

## 2022-10-04 ASSESSMENT — FIBROSIS 4 INDEX: FIB4 SCORE: 1.1

## 2022-10-04 NOTE — PROGRESS NOTES
Chief Complaint   Patient presents with    Hand Pain     Referral from UC/ R hand pain      Subjective     Referred by Opal Gilbert MD  for evaluation of RIGHT hand pain (right-handed dominant)  Insidious onset, early spring 2022  Pain is predominantly at the base of the RIGHT thumb  Pain can be sharp and worse with bumping the thumb/hand or even pronation/supination  She has tried thumb spica splint which helps some, but the pain persists  No radiation  She is also experiencing some new onset of pain at the dorsal aspect of the hand along the fourth metacarpal region  POSITIVE night symptoms/waking  History of sarcoidosis so she tries to avoid taking any pain medication secondary to her kidney failure  No imaging has been performed  She has not undergone any formal therapy    Newly retired  Activities include hunting (has stopped due to sarcoidosis), travel    Review of Systems   Constitutional:  Negative for chills and fever.   Respiratory:  Negative for shortness of breath.    Cardiovascular:  Negative for chest pain.   Gastrointestinal:  Negative for nausea and vomiting.   Neurological:  Negative for dizziness.     PMH:  has a past medical history of Allergy, Anesthesia, Chickenpox, Heart burn, Kidney stone, Mumps, Pain (08/12/2010), and Sarcoidosis.  MEDS:   Current Outpatient Medications:     oxybutynin (DITROPAN) 5 MG Tab, Take 5 mg by mouth 3 times a day as needed., Disp: , Rfl:     omeprazole (PRILOSEC) 40 MG delayed-release capsule, Take 1 Capsule by mouth every day., Disp: 30 Capsule, Rfl: 5    hydroxychloroquine (PLAQUENIL) 200 MG Tab, Take 1.5 Tablets by mouth every day., Disp: 135 Tablet, Rfl: 1    inFLIXimab (REMICADE) 100 MG Recon Soln, Infuse  into a venous catheter. Every 5 weeks, Disp: , Rfl:     Misc. Devices Misc, Right sided thumb spica splint, Disp: 1 Each, Rfl: 0    Acetaminophen (TYLENOL PO), Take 2 Tabs by mouth as needed., Disp: , Rfl:   ALLERGIES:   Allergies   Allergen Reactions     "Epinephrine Rash and Palpitations     .    Fish Anaphylaxis    Latex Rash     .      Other Misc Swelling     Tightness of the throat and systemic swelling     Shellfish Allergy Anaphylaxis    Food Hives     Tomatoes and walnuts cause blisters in mouth      Other Environmental Rash     Perfums, bleach, soaps  HOSPITAL LINENS    Fish Oil Anaphylaxis     SURGHX:   Past Surgical History:   Procedure Laterality Date    BRONCHOSCOPY  2017    Procedure: FIBEROPTIC BRONCHOSCOPY, BROCHOALVEOLAR LAVAGE WITH FINE NEEDLE ASPIRATION AND BIOSY, ENDOBRONCHIAL ULTRASOUND ;  Surgeon: Adonis Warner M.D.;  Location: SURGERY SAME DAY [facility];  Service:     CERVICAL DISK AND FUSION ANTERIOR  2010    Performed by MUKUND MAYNARD at SURGERY [facility] ORS    CERVICAL FUSION POSTERIOR  2010    Performed by MUKUND MAYNARD at SURGERY [facility] ORS    LUMPECTOMY Right     atypical cells, not cancer    CHOLECYSTECTOMY      PRIMARY C SECTION  1998        SINUSOTOMIES Right     OTHER ORTHOPEDIC SURGERY       hand      SOCHX:  reports that she has never smoked. She has never used smokeless tobacco. She reports that she does not drink alcohol and does not use drugs.  FH: Family history was reviewed, no pertinent findings to report    Objective   /78 (BP Location: Left arm, Patient Position: Sitting, BP Cuff Size: Adult)   Pulse 72   Temp 36.8 °C (98.3 °F) (Temporal)   Resp 16   Ht 1.626 m (5' 4.02\")   Wt 73.3 kg (161 lb 9.6 oz)   LMP 2010   SpO2 98%   BMI 27.72 kg/m²     Hand exam    NAD  Alert and oriented    BILATERAL ELBOW exam  Range of motion intact  No swelling  No tenderness of the medial or lateral epicondyle  Resisted finger extension test is NEGATIVE    BILATERAL WRIST exam  Range of motion intact  No tenderness along scaphoid, TFCC insertion, distal radius or distal ulna  POSITIVE small, pea-sized soft tissue mass at the dorsal aspect of the RIGHT wrist which is mildly " tender  Tinel's testing is NEGATIVE  The hand is otherwise neurovascularly intact    BILATERAL hand exam  NO swelling  NO deformity  Range of motion of all MCP, DIP and PIP joints NORMAL  Pinky opposition NORMAL  No tenderness along the de Quervain's tendon sheath  Grind test is POSITIVE at the RIGHT CMC joint compared to the left  Collateral ligament testing is NORMAL    1. CMC DJD(carpometacarpal degenerative joint disease), localized primary, right  DX-HAND 3+ RIGHT      2. Ganglion cyst of dorsum of right wrist          Insidious onset, early spring 2022  Pain is predominantly at the base of the RIGHT thumb  Pain can be sharp and worse with bumping the thumb/hand or even pronation/supination    We discussed treatment options, she has failed thumb spica splint  Patient has elected to proceed with RIGHT CMC joint corticosteroid injection under ultrasound guidance    Return in about 1 week (around 10/11/2022).  For RIGHT CMC joint corticosteroid injection under ultrasound guidance        10/4/2022 10:25 AM     HISTORY/REASON FOR EXAM:  Atraumatic Pain/Swelling/Deformity; Suspect CMC arthritis.        TECHNIQUE/EXAM DESCRIPTION AND NUMBER OF VIEWS:  3 views of the  RIGHT hand.     COMPARISON: Hand radiographs 5/9/2019     FINDINGS:     MINERALIZATION: Mineralization is unremarkable for age.     INJURY: No acute fracture or gross malalignment is seen.     JOINTS: No erosive arthropathy is evident.  There are moderate degenerative changes of the basilar joints of the thumb with joint space narrowing, subchondral sclerosis and marginal osteophyte formation. This appears more advanced than on the prior   study.           IMPRESSION:     Osteoarthritis           Exam Ended: 10/04/22 10:31 AM Last Resulted: 10/04/22 10:35 AM           Interpreted in the office today with the patient    Thank you Opal Gilbert MD for allowing me to participate in caring for your patient.

## 2022-10-04 NOTE — Clinical Note
Addy Joseph, Thank you for referring Alesia to our sports medicine clinic. I think the majority of her symptoms are coming from CMC joint osteoarthritis.  We scheduled her for a ultrasound-guided corticosteroid injection of the CMC joint. She also has a dorsal ganglion cyst which may be causing some of her dorsal hand pain as well. We plan on seeing how she does once we treat the CMC joint and we can address the ganglion cyst if it remains symptomatic over time.  Hope you are well! L

## 2022-10-07 ENCOUNTER — OFFICE VISIT (OUTPATIENT)
Dept: SPORTS MEDICINE | Facility: CLINIC | Age: 62
End: 2022-10-07
Payer: COMMERCIAL

## 2022-10-07 VITALS — HEIGHT: 64 IN | WEIGHT: 161.6 LBS | BODY MASS INDEX: 27.59 KG/M2

## 2022-10-07 DIAGNOSIS — M19.031 CMC DJD(CARPOMETACARPAL DEGENERATIVE JOINT DISEASE), LOCALIZED PRIMARY, RIGHT: ICD-10-CM

## 2022-10-07 PROCEDURE — 20604 DRAIN/INJ JOINT/BURSA W/US: CPT | Mod: RT | Performed by: FAMILY MEDICINE

## 2022-10-07 RX ORDER — TRIAMCINOLONE ACETONIDE 40 MG/ML
20 INJECTION, SUSPENSION INTRA-ARTICULAR; INTRAMUSCULAR ONCE
Status: COMPLETED | OUTPATIENT
Start: 2022-10-07 | End: 2022-10-07

## 2022-10-07 RX ADMIN — TRIAMCINOLONE ACETONIDE 20 MG: 40 INJECTION, SUSPENSION INTRA-ARTICULAR; INTRAMUSCULAR at 11:12

## 2022-10-07 ASSESSMENT — FIBROSIS 4 INDEX: FIB4 SCORE: 1.1

## 2022-10-07 NOTE — PROGRESS NOTES
Chief Complaint   Patient presents with    Finger Pain     F/V R CMC joint injection with U/S      1. CMC DJD(carpometacarpal degenerative joint disease), localized primary, right  triamcinolone acetonide (KENALOG-40) injection 20 mg        PROCEDURE NOTE:  right HAND corticosteroid injection CMC joint corticosteroid injection under ultrasound guidance  Risks and benefits discussed  Informed consent obtained  Hand prepped in sterile fashion utilizing  20 mg of Kenalog and 0.5 cc of 2% lidocaine injected into the RIGHT CMC joint with indirect needle visualization under ultrasound guidance  Vapocoolant spray was utilized  Patient tolerated the procedure well  Postprocedure care and red flags discussed

## 2022-11-03 RX ORDER — DIPHENHYDRAMINE HCL 25 MG
25 TABLET ORAL ONCE
Status: CANCELLED | OUTPATIENT
Start: 2022-11-11 | End: 2022-11-11

## 2022-11-03 RX ORDER — ACETAMINOPHEN 325 MG/1
650 TABLET ORAL ONCE
Status: CANCELLED | OUTPATIENT
Start: 2022-11-11

## 2022-11-04 ENCOUNTER — OFFICE VISIT (OUTPATIENT)
Dept: SPORTS MEDICINE | Facility: CLINIC | Age: 62
End: 2022-11-04
Payer: COMMERCIAL

## 2022-11-04 VITALS
RESPIRATION RATE: 18 BRPM | WEIGHT: 161.82 LBS | HEART RATE: 82 BPM | BODY MASS INDEX: 27.63 KG/M2 | SYSTOLIC BLOOD PRESSURE: 118 MMHG | HEIGHT: 64 IN | TEMPERATURE: 98.3 F | DIASTOLIC BLOOD PRESSURE: 76 MMHG | OXYGEN SATURATION: 96 %

## 2022-11-04 DIAGNOSIS — M67.431 GANGLION CYST OF DORSUM OF RIGHT WRIST: ICD-10-CM

## 2022-11-04 DIAGNOSIS — M19.031 CMC DJD(CARPOMETACARPAL DEGENERATIVE JOINT DISEASE), LOCALIZED PRIMARY, RIGHT: ICD-10-CM

## 2022-11-04 PROCEDURE — 99213 OFFICE O/P EST LOW 20 MIN: CPT | Performed by: FAMILY MEDICINE

## 2022-11-04 ASSESSMENT — FIBROSIS 4 INDEX: FIB4 SCORE: 1.1

## 2022-11-04 NOTE — PROGRESS NOTES
"Chief Complaint   Patient presents with    Finger Pain     F/V R CMC joint pain      Subjective     Referred by Opal Gilbert MD  for evaluation of RIGHT hand pain (right-handed dominant)  Insidious onset, early spring 2022  Pain is predominantly at the base of the RIGHT thumb  Pain can be sharp and worse with bumping the thumb/hand or even pronation/supination  She has tried thumb spica splint which helps some, but the pain persists  No radiation    Overall she is approximately 50% improved  Less pain  Still having some trouble opening jars and using a can opener    Newly retired  Activities include hunting (has stopped due to sarcoidosis), travel    Objective   /76 (BP Location: Left arm, Patient Position: Sitting, BP Cuff Size: Adult)   Pulse 82   Temp 36.8 °C (98.3 °F) (Temporal)   Resp 18   Ht 1.626 m (5' 4.02\")   Wt 73.4 kg (161 lb 13.1 oz)   LMP 07/01/2010   SpO2 96%   BMI 27.76 kg/m²     Hand exam    NAD  Alert and oriented    BILATERAL hand exam  NO swelling  NO deformity  Range of motion of all MCP, DIP and PIP joints NORMAL  Pinky opposition NORMAL  No tenderness along the de Quervain's tendon sheath  Grind test is weakly POSITIVE at the RIGHT CMC joint compared to the left  Collateral ligament testing is NORMAL    1. CMC DJD(carpometacarpal degenerative joint disease), localized primary, right        2. Ganglion cyst of dorsum of right wrist          Insidious onset, early spring 2022  Pain is predominantly at the base of the RIGHT thumb  Pain can be sharp and worse with bumping the thumb/hand or even pronation/supination    RIGHT CMC joint corticosteroid injection under ultrasound guidance HELPED  Overall, approximately 50% improved    Offered occupational therapy, but patient politely declined   She has done some exercises for the contralateral hand and will try some exercises on her own first    She can follow-up as needed or she can send us a [employer] message if she changes her mind " about a occupational/hand therapy referral        10/4/2022 10:25 AM     HISTORY/REASON FOR EXAM:  Atraumatic Pain/Swelling/Deformity; Suspect CMC arthritis.        TECHNIQUE/EXAM DESCRIPTION AND NUMBER OF VIEWS:  3 views of the  RIGHT hand.     COMPARISON: Hand radiographs 5/9/2019     FINDINGS:     MINERALIZATION: Mineralization is unremarkable for age.     INJURY: No acute fracture or gross malalignment is seen.     JOINTS: No erosive arthropathy is evident.  There are moderate degenerative changes of the basilar joints of the thumb with joint space narrowing, subchondral sclerosis and marginal osteophyte formation. This appears more advanced than on the prior   study.           IMPRESSION:     Osteoarthritis           Exam Ended: 10/04/22 10:31 AM Last Resulted: 10/04/22 10:35 AM           Thank you Opal Gilbert MD for allowing me to participate in caring for your patient.

## 2022-11-10 ENCOUNTER — OUTPATIENT INFUSION SERVICES (OUTPATIENT)
Dept: ONCOLOGY | Facility: MEDICAL CENTER | Age: 62
End: 2022-11-10
Attending: FAMILY MEDICINE
Payer: COMMERCIAL

## 2022-11-10 VITALS
RESPIRATION RATE: 18 BRPM | SYSTOLIC BLOOD PRESSURE: 122 MMHG | WEIGHT: 167.11 LBS | HEIGHT: 65 IN | OXYGEN SATURATION: 98 % | BODY MASS INDEX: 27.84 KG/M2 | DIASTOLIC BLOOD PRESSURE: 74 MMHG | HEART RATE: 68 BPM | TEMPERATURE: 97 F

## 2022-11-10 DIAGNOSIS — D86.9 SARCOIDOSIS: ICD-10-CM

## 2022-11-10 LAB
ALBUMIN SERPL BCP-MCNC: 4.4 G/DL (ref 3.2–4.9)
ALBUMIN/GLOB SERPL: 1.6 G/DL
ALP SERPL-CCNC: 78 U/L (ref 30–99)
ALT SERPL-CCNC: 17 U/L (ref 2–50)
ANION GAP SERPL CALC-SCNC: 11 MMOL/L (ref 7–16)
AST SERPL-CCNC: 18 U/L (ref 12–45)
BASOPHILS # BLD AUTO: 0.9 % (ref 0–1.8)
BASOPHILS # BLD: 0.06 K/UL (ref 0–0.12)
BILIRUB SERPL-MCNC: 0.9 MG/DL (ref 0.1–1.5)
BUN SERPL-MCNC: 20 MG/DL (ref 8–22)
CALCIUM SERPL-MCNC: 9.2 MG/DL (ref 8.5–10.5)
CHLORIDE SERPL-SCNC: 105 MMOL/L (ref 96–112)
CO2 SERPL-SCNC: 22 MMOL/L (ref 20–33)
CREAT SERPL-MCNC: 1.01 MG/DL (ref 0.5–1.4)
CRP SERPL HS-MCNC: <0.3 MG/DL (ref 0–0.75)
EOSINOPHIL # BLD AUTO: 0.27 K/UL (ref 0–0.51)
EOSINOPHIL NFR BLD: 3.9 % (ref 0–6.9)
ERYTHROCYTE [DISTWIDTH] IN BLOOD BY AUTOMATED COUNT: 43.7 FL (ref 35.9–50)
ERYTHROCYTE [SEDIMENTATION RATE] IN BLOOD BY WESTERGREN METHOD: 16 MM/HOUR (ref 0–25)
GFR SERPLBLD CREATININE-BSD FMLA CKD-EPI: 64 ML/MIN/1.73 M 2
GLOBULIN SER CALC-MCNC: 2.7 G/DL (ref 1.9–3.5)
GLUCOSE SERPL-MCNC: 85 MG/DL (ref 65–99)
HBV CORE AB SERPL QL IA: NONREACTIVE
HBV SURFACE AG SER QL: NORMAL
HCT VFR BLD AUTO: 39.8 % (ref 37–47)
HGB BLD-MCNC: 13.3 G/DL (ref 12–16)
IMM GRANULOCYTES # BLD AUTO: 0.02 K/UL (ref 0–0.11)
IMM GRANULOCYTES NFR BLD AUTO: 0.3 % (ref 0–0.9)
LYMPHOCYTES # BLD AUTO: 1.53 K/UL (ref 1–4.8)
LYMPHOCYTES NFR BLD: 22.4 % (ref 22–41)
MCH RBC QN AUTO: 31.8 PG (ref 27–33)
MCHC RBC AUTO-ENTMCNC: 33.4 G/DL (ref 33.6–35)
MCV RBC AUTO: 95.2 FL (ref 81.4–97.8)
MONOCYTES # BLD AUTO: 0.66 K/UL (ref 0–0.85)
MONOCYTES NFR BLD AUTO: 9.6 % (ref 0–13.4)
NEUTROPHILS # BLD AUTO: 4.3 K/UL (ref 2–7.15)
NEUTROPHILS NFR BLD: 62.9 % (ref 44–72)
NRBC # BLD AUTO: 0 K/UL
NRBC BLD-RTO: 0 /100 WBC
OUTPT INFUS CBC COMMENT OICOM: ABNORMAL
PLATELET # BLD AUTO: 242 K/UL (ref 164–446)
PMV BLD AUTO: 8.9 FL (ref 9–12.9)
POTASSIUM SERPL-SCNC: 3.9 MMOL/L (ref 3.6–5.5)
PROT SERPL-MCNC: 7.1 G/DL (ref 6–8.2)
RBC # BLD AUTO: 4.18 M/UL (ref 4.2–5.4)
SODIUM SERPL-SCNC: 138 MMOL/L (ref 135–145)
WBC # BLD AUTO: 6.8 K/UL (ref 4.8–10.8)

## 2022-11-10 PROCEDURE — 700105 HCHG RX REV CODE 258: Performed by: NURSE PRACTITIONER

## 2022-11-10 PROCEDURE — 80053 COMPREHEN METABOLIC PANEL: CPT

## 2022-11-10 PROCEDURE — 87340 HEPATITIS B SURFACE AG IA: CPT

## 2022-11-10 PROCEDURE — 85025 COMPLETE CBC W/AUTO DIFF WBC: CPT

## 2022-11-10 PROCEDURE — 86480 TB TEST CELL IMMUN MEASURE: CPT

## 2022-11-10 PROCEDURE — 700111 HCHG RX REV CODE 636 W/ 250 OVERRIDE (IP): Performed by: NURSE PRACTITIONER

## 2022-11-10 PROCEDURE — 96413 CHEMO IV INFUSION 1 HR: CPT

## 2022-11-10 PROCEDURE — 86704 HEP B CORE ANTIBODY TOTAL: CPT

## 2022-11-10 PROCEDURE — 86140 C-REACTIVE PROTEIN: CPT

## 2022-11-10 PROCEDURE — 85652 RBC SED RATE AUTOMATED: CPT

## 2022-11-10 RX ORDER — DIPHENHYDRAMINE HCL 25 MG
25 TABLET ORAL ONCE
Status: CANCELLED | OUTPATIENT
Start: 2022-11-11 | End: 2022-11-11

## 2022-11-10 RX ORDER — DIPHENHYDRAMINE HCL 25 MG
25 TABLET ORAL ONCE
Status: DISCONTINUED | OUTPATIENT
Start: 2022-11-10 | End: 2022-11-10 | Stop reason: HOSPADM

## 2022-11-10 RX ORDER — ACETAMINOPHEN 325 MG/1
650 TABLET ORAL ONCE
Status: CANCELLED | OUTPATIENT
Start: 2022-11-11

## 2022-11-10 RX ORDER — ACETAMINOPHEN 325 MG/1
650 TABLET ORAL ONCE
Status: DISCONTINUED | OUTPATIENT
Start: 2022-11-10 | End: 2022-11-10 | Stop reason: HOSPADM

## 2022-11-10 RX ADMIN — INFLIXIMAB-AXXQ 400 MG: 100 INJECTION, POWDER, LYOPHILIZED, FOR SOLUTION INTRAVENOUS at 17:13

## 2022-11-10 ASSESSMENT — FIBROSIS 4 INDEX: FIB4 SCORE: 1.1

## 2022-11-10 ASSESSMENT — PAIN DESCRIPTION - PAIN TYPE: TYPE: CHRONIC PAIN

## 2022-11-11 LAB
GAMMA INTERFERON BACKGROUND BLD IA-ACNC: 0.08 IU/ML
M TB IFN-G BLD-IMP: NEGATIVE
M TB IFN-G CD4+ BCKGRND COR BLD-ACNC: 0.01 IU/ML
MITOGEN IGNF BCKGRD COR BLD-ACNC: >10 IU/ML
QFT TB2 - NIL TBQ2: -0.06 IU/ML

## 2022-11-11 NOTE — PROGRESS NOTES
Alesia Lewis arrived at the Infusion Center ambulating with no assist.  This is the first visit for the Pt to [facility].  The Pt states she has been receiving Infusions of the Infliximab-axxq for a while in [place], [place].  Pt states that she has not had any issues receiving the infusions over 1 hour. Pt states pain is chronic.  Pt declined premedications.    PIV size 24 obtained with brisk blood flow.  Labs obtained and sent.  Infliximab-axxq infused over an hour with no adverse reactions. PIV removed.  Pt ambulatory with no assistance and with no s/s of distress.

## 2023-01-12 ENCOUNTER — OUTPATIENT INFUSION SERVICES (OUTPATIENT)
Dept: ONCOLOGY | Facility: MEDICAL CENTER | Age: 63
End: 2023-01-12
Attending: FAMILY MEDICINE
Payer: COMMERCIAL

## 2023-01-12 VITALS
HEART RATE: 78 BPM | TEMPERATURE: 98.6 F | HEIGHT: 65 IN | SYSTOLIC BLOOD PRESSURE: 117 MMHG | DIASTOLIC BLOOD PRESSURE: 75 MMHG | OXYGEN SATURATION: 98 % | WEIGHT: 162.7 LBS | RESPIRATION RATE: 18 BRPM | BODY MASS INDEX: 27.11 KG/M2

## 2023-01-12 DIAGNOSIS — D86.9 SARCOIDOSIS: ICD-10-CM

## 2023-01-12 LAB
ALBUMIN SERPL BCP-MCNC: 4.3 G/DL (ref 3.2–4.9)
ALBUMIN/GLOB SERPL: 1.6 G/DL
ALP SERPL-CCNC: 77 U/L (ref 30–99)
ALT SERPL-CCNC: 13 U/L (ref 2–50)
ANION GAP SERPL CALC-SCNC: 9 MMOL/L (ref 7–16)
AST SERPL-CCNC: 17 U/L (ref 12–45)
BASOPHILS # BLD AUTO: 1.4 % (ref 0–1.8)
BASOPHILS # BLD: 0.07 K/UL (ref 0–0.12)
BILIRUB SERPL-MCNC: 0.9 MG/DL (ref 0.1–1.5)
BUN SERPL-MCNC: 18 MG/DL (ref 8–22)
CALCIUM ALBUM COR SERPL-MCNC: 9.1 MG/DL (ref 8.5–10.5)
CALCIUM SERPL-MCNC: 9.3 MG/DL (ref 8.5–10.5)
CHLORIDE SERPL-SCNC: 104 MMOL/L (ref 96–112)
CO2 SERPL-SCNC: 29 MMOL/L (ref 20–33)
CREAT SERPL-MCNC: 1.09 MG/DL (ref 0.5–1.4)
CRP SERPL HS-MCNC: <0.3 MG/DL (ref 0–0.75)
EOSINOPHIL # BLD AUTO: 0.31 K/UL (ref 0–0.51)
EOSINOPHIL NFR BLD: 6.1 % (ref 0–6.9)
ERYTHROCYTE [DISTWIDTH] IN BLOOD BY AUTOMATED COUNT: 41.1 FL (ref 35.9–50)
ERYTHROCYTE [SEDIMENTATION RATE] IN BLOOD BY WESTERGREN METHOD: 13 MM/HOUR (ref 0–25)
GFR SERPLBLD CREATININE-BSD FMLA CKD-EPI: 58 ML/MIN/1.73 M 2
GLOBULIN SER CALC-MCNC: 2.7 G/DL (ref 1.9–3.5)
GLUCOSE SERPL-MCNC: 84 MG/DL (ref 65–99)
HCT VFR BLD AUTO: 41.6 % (ref 37–47)
HGB BLD-MCNC: 14.2 G/DL (ref 12–16)
IMM GRANULOCYTES # BLD AUTO: 0.01 K/UL (ref 0–0.11)
IMM GRANULOCYTES NFR BLD AUTO: 0.2 % (ref 0–0.9)
LYMPHOCYTES # BLD AUTO: 1.11 K/UL (ref 1–4.8)
LYMPHOCYTES NFR BLD: 21.9 % (ref 22–41)
MCH RBC QN AUTO: 32.1 PG (ref 27–33)
MCHC RBC AUTO-ENTMCNC: 34.1 G/DL (ref 33.6–35)
MCV RBC AUTO: 94.1 FL (ref 81.4–97.8)
MONOCYTES # BLD AUTO: 0.73 K/UL (ref 0–0.85)
MONOCYTES NFR BLD AUTO: 14.4 % (ref 0–13.4)
NEUTROPHILS # BLD AUTO: 2.83 K/UL (ref 2–7.15)
NEUTROPHILS NFR BLD: 56 % (ref 44–72)
NRBC # BLD AUTO: 0 K/UL
NRBC BLD-RTO: 0 /100 WBC
OUTPT INFUS CBC COMMENT OICOM: ABNORMAL
PLATELET # BLD AUTO: 240 K/UL (ref 164–446)
PMV BLD AUTO: 9.3 FL (ref 9–12.9)
POTASSIUM SERPL-SCNC: 4.3 MMOL/L (ref 3.6–5.5)
PROT SERPL-MCNC: 7 G/DL (ref 6–8.2)
RBC # BLD AUTO: 4.42 M/UL (ref 4.2–5.4)
SODIUM SERPL-SCNC: 142 MMOL/L (ref 135–145)
WBC # BLD AUTO: 5.1 K/UL (ref 4.8–10.8)

## 2023-01-12 PROCEDURE — 80053 COMPREHEN METABOLIC PANEL: CPT

## 2023-01-12 PROCEDURE — 86140 C-REACTIVE PROTEIN: CPT

## 2023-01-12 PROCEDURE — 700111 HCHG RX REV CODE 636 W/ 250 OVERRIDE (IP): Performed by: NURSE PRACTITIONER

## 2023-01-12 PROCEDURE — 96415 CHEMO IV INFUSION ADDL HR: CPT

## 2023-01-12 PROCEDURE — 85652 RBC SED RATE AUTOMATED: CPT

## 2023-01-12 PROCEDURE — 700105 HCHG RX REV CODE 258: Performed by: NURSE PRACTITIONER

## 2023-01-12 PROCEDURE — 85025 COMPLETE CBC W/AUTO DIFF WBC: CPT

## 2023-01-12 PROCEDURE — 96413 CHEMO IV INFUSION 1 HR: CPT

## 2023-01-12 RX ORDER — DIPHENHYDRAMINE HCL 25 MG
25 TABLET ORAL ONCE
Status: CANCELLED | OUTPATIENT
Start: 2023-02-02 | End: 2023-02-02

## 2023-01-12 RX ORDER — DIPHENHYDRAMINE HCL 25 MG
25 TABLET ORAL ONCE
Status: DISCONTINUED | OUTPATIENT
Start: 2023-01-12 | End: 2023-01-12 | Stop reason: HOSPADM

## 2023-01-12 RX ORDER — ACETAMINOPHEN 325 MG/1
650 TABLET ORAL ONCE
Status: DISCONTINUED | OUTPATIENT
Start: 2023-01-12 | End: 2023-01-12 | Stop reason: HOSPADM

## 2023-01-12 RX ORDER — ACETAMINOPHEN 325 MG/1
650 TABLET ORAL ONCE
Status: CANCELLED | OUTPATIENT
Start: 2023-02-02

## 2023-01-12 RX ADMIN — INFLIXIMAB-AXXQ 400 MG: 100 INJECTION, POWDER, LYOPHILIZED, FOR SOLUTION INTRAVENOUS at 11:43

## 2023-01-12 ASSESSMENT — PAIN DESCRIPTION - PAIN TYPE: TYPE: CHRONIC PAIN

## 2023-01-12 ASSESSMENT — FIBROSIS 4 INDEX: FIB4 SCORE: 1.08

## 2023-01-12 NOTE — PROGRESS NOTES
Alesia presents to [facility] for q 5 week infliximab.  She denies s/s active infection or open wounds.  PIV placed, flushes easily, + blood return observed.  Q 12 week labs collected.  Pre-medications declined.  Infliximab infused as ordered over 2 hours per patient preference.  Alesia tolerated well.  PIV flushed and removed, gauze and coban to site.  Discharged in NAD, next appointment confirmed.

## 2023-02-22 ENCOUNTER — APPOINTMENT (OUTPATIENT)
Dept: MEDICAL GROUP | Facility: PHYSICIAN GROUP | Age: 63
End: 2023-02-22
Payer: COMMERCIAL

## 2023-02-23 ENCOUNTER — OUTPATIENT INFUSION SERVICES (OUTPATIENT)
Dept: ONCOLOGY | Facility: MEDICAL CENTER | Age: 63
End: 2023-02-23
Attending: FAMILY MEDICINE
Payer: COMMERCIAL

## 2023-02-23 VITALS
HEIGHT: 65 IN | SYSTOLIC BLOOD PRESSURE: 120 MMHG | OXYGEN SATURATION: 94 % | DIASTOLIC BLOOD PRESSURE: 70 MMHG | WEIGHT: 166.45 LBS | TEMPERATURE: 97.4 F | RESPIRATION RATE: 16 BRPM | BODY MASS INDEX: 27.73 KG/M2 | HEART RATE: 84 BPM

## 2023-02-23 DIAGNOSIS — D86.9 SARCOIDOSIS: ICD-10-CM

## 2023-02-23 PROCEDURE — 96413 CHEMO IV INFUSION 1 HR: CPT

## 2023-02-23 PROCEDURE — 700111 HCHG RX REV CODE 636 W/ 250 OVERRIDE (IP): Performed by: NURSE PRACTITIONER

## 2023-02-23 PROCEDURE — 700105 HCHG RX REV CODE 258: Performed by: NURSE PRACTITIONER

## 2023-02-23 RX ORDER — ACETAMINOPHEN 325 MG/1
650 TABLET ORAL ONCE
Status: DISCONTINUED | OUTPATIENT
Start: 2023-02-23 | End: 2023-02-23 | Stop reason: HOSPADM

## 2023-02-23 RX ORDER — DIPHENHYDRAMINE HCL 25 MG
25 TABLET ORAL ONCE
Status: CANCELLED | OUTPATIENT
Start: 2023-04-06 | End: 2023-04-06

## 2023-02-23 RX ORDER — ACETAMINOPHEN 325 MG/1
650 TABLET ORAL ONCE
Status: CANCELLED | OUTPATIENT
Start: 2023-04-06

## 2023-02-23 RX ORDER — DIPHENHYDRAMINE HCL 25 MG
25 TABLET ORAL ONCE
Status: DISCONTINUED | OUTPATIENT
Start: 2023-02-23 | End: 2023-02-23 | Stop reason: HOSPADM

## 2023-02-23 RX ADMIN — INFLIXIMAB-AXXQ 400 MG: 100 INJECTION, POWDER, LYOPHILIZED, FOR SOLUTION INTRAVENOUS at 11:43

## 2023-02-23 ASSESSMENT — FIBROSIS 4 INDEX: FIB4 SCORE: 1.178737916978611884

## 2023-02-23 NOTE — PROGRESS NOTES
Pt presented to infusion center for Q 5 week Infliximab. Denies any current s/s of infection or open wounds. PIV started, brisk blood return observed. Pre-meds declined. Infliximab infused over 90 mins (pt wanted to try slightly shorter infusion today, will try 1 hour next time) with filter in place with no s/s of adverse reaction. PIV flushed, removed and gauze and coban dressing placed. Pt has next appt, left on foot to self care.

## 2023-03-20 ENCOUNTER — OFFICE VISIT (OUTPATIENT)
Dept: MEDICAL GROUP | Facility: PHYSICIAN GROUP | Age: 63
End: 2023-03-20
Payer: COMMERCIAL

## 2023-03-20 VITALS
HEART RATE: 76 BPM | OXYGEN SATURATION: 98 % | BODY MASS INDEX: 27.96 KG/M2 | TEMPERATURE: 98.7 F | RESPIRATION RATE: 16 BRPM | SYSTOLIC BLOOD PRESSURE: 128 MMHG | DIASTOLIC BLOOD PRESSURE: 70 MMHG | WEIGHT: 167.8 LBS | HEIGHT: 65 IN

## 2023-03-20 DIAGNOSIS — Z23 NEED FOR VACCINATION: ICD-10-CM

## 2023-03-20 DIAGNOSIS — Z00.00 WELLNESS EXAMINATION: Primary | ICD-10-CM

## 2023-03-20 PROCEDURE — 90471 IMMUNIZATION ADMIN: CPT | Performed by: FAMILY MEDICINE

## 2023-03-20 PROCEDURE — 99396 PREV VISIT EST AGE 40-64: CPT | Mod: 25 | Performed by: FAMILY MEDICINE

## 2023-03-20 PROCEDURE — 90686 IIV4 VACC NO PRSV 0.5 ML IM: CPT | Performed by: FAMILY MEDICINE

## 2023-03-20 ASSESSMENT — FIBROSIS 4 INDEX: FIB4 SCORE: 1.178737916978611884

## 2023-03-20 ASSESSMENT — PATIENT HEALTH QUESTIONNAIRE - PHQ9: CLINICAL INTERPRETATION OF PHQ2 SCORE: 0

## 2023-03-20 NOTE — PROGRESS NOTES
Subjective:     CC:   Chief Complaint   Patient presents with    Annual Exam       HPI:   Alesia Lewis is a 62 y.o. female who presents for annual exam. She is feeling well and denies any complaints.    Ob-Gyn/ History:    Patient has GYN provider: no  /Para:  3  Last Pap Smear:  . No history of abnormal pap smears.  Gyn Surgery:   x1.  Current Contraceptive Method:  N/a. Yes currently sexually active.  Last menstrual period:  ~.  No significant bloating/fluid retention, pelvic pain, or dyspareunia. No vaginal discharge  Post-menopausal bleeding: no  Urinary incontinence: +stress & urge - follows with urology  Folate intake: n/a     Health Maintenance  Advanced directive: n/a   Osteoporosis Screen/ DEXA: n/a   PT/vit D for falls prevention: n/a   Cholesterol Screening: ordered   Diabetes Screenin2023 - non-fasting glucose 84   Aspirin Use: TBD      Anticipatory Guidance  Diet: trying, struggled with snacking   Exercise: just restarted - elliptical   Substance Abuse: no   Safe in relationship - .  Seat belts, bike helmet, gun safety discussed.  Sun protection used.    Cancer screening  Colorectal Cancer Screening: ordered by rheumatology   Lung Cancer Screening: n/a - never smoker    Cervical Cancer Screening: deferred   Breast Cancer Screening: due 2024    Infectious disease screening/Immunizations  --STI Screening: declined   --Practices safe sex.  --HIV Screening: reports neg in past   --Hepatitis C Screening: completed - neg ()   --Immunizations:    Influenza: today    HPV:  n/a    Tetanus: due     Shingles: recommended   Pneumococcal: n/a    Hepatitis B: n/a  COVID-19: bivalent booster recommended  Other immunizations: n/a     She  has a past medical history of Allergy, Anesthesia, Chickenpox, Heart burn, Kidney stone, Mumps, Pain (2010), and Sarcoidosis.  She  has a past surgical history that includes other orthopedic surgery (); cholecystectomy  (1999); bronchoscopy (5/18/2017); primary c section (1998); cervical disk and fusion anterior (8/24/2010); cervical fusion posterior (8/24/2010); sinusotomies (Right, 1984); and lumpectomy (Right, 2001).    Family History   Problem Relation Age of Onset    Asthma Mother     Other Mother         Hep C    Kidney Disease Mother         hepatorenal, s/p liver-kidney transplant    Heart Disease Father 61        MI, stent    Breast Cancer Maternal Aunt     Breast Cancer Maternal Aunt     Heart Disease Maternal Grandmother     Cancer Maternal Grandmother         leukemia    Kidney stones Maternal Grandfather     Hypertension Paternal Grandfather     Stroke Paternal Grandfather     Diabetes Neg Hx     Ovarian Cancer Neg Hx     Tubal Cancer Neg Hx     Peritoneal Cancer Neg Hx     Colorectal Cancer Neg Hx        Social History     Socioeconomic History    Marital status:      Spouse name: Not on file    Number of children: Not on file    Years of education: Not on file    Highest education level: Some college, no degree   Occupational History    Not on file   Tobacco Use    Smoking status: Never    Smokeless tobacco: Never   Vaping Use    Vaping Use: Never used   Substance and Sexual Activity    Alcohol use: No    Drug use: No    Sexual activity: Yes     Partners: Male     Birth control/protection: Male Sterilization     Comment:    Other Topics Concern    Not on file   Social History Narrative    Not on file     Social Determinants of Health     Financial Resource Strain: Low Risk     Difficulty of Paying Living Expenses: Not very hard   Food Insecurity: No Food Insecurity    Worried About Running Out of Food in the Last Year: Never true    Ran Out of Food in the Last Year: Never true   Transportation Needs: No Transportation Needs    Lack of Transportation (Medical): No    Lack of Transportation (Non-Medical): No   Physical Activity: Insufficiently Active    Days of Exercise per Week: 2 days    Minutes of  Exercise per Session: 10 min   Stress: No Stress Concern Present    Feeling of Stress : Only a little   Social Connections: Moderately Isolated    Frequency of Communication with Friends and Family: More than three times a week    Frequency of Social Gatherings with Friends and Family: Once a week    Attends Worship Services: Never    Active Member of Clubs or Organizations: No    Attends Club or Organization Meetings: Never    Marital Status:    Intimate Partner Violence: Not on file   Housing Stability: Unknown    Unable to Pay for Housing in the Last Year: No    Number of Places Lived in the Last Year: Not on file    Unstable Housing in the Last Year: No       Patient Active Problem List    Diagnosis Date Noted    History of COVID-19 12/29/2020    De Quervain's tenosynovitis, right 10/23/2020    Stage 3a chronic kidney disease (HCC) 10/23/2020    Sarcoidosis 07/31/2020         Current Outpatient Medications   Medication Sig Dispense Refill    hydroxychloroquine (PLAQUENIL) 200 MG Tab 400mg (2 tabs) Monday through Friday and 200mg (1 tab) Saturdays and sundays 180 Tablet 1    omeprazole (PRILOSEC) 40 MG delayed-release capsule Take 1 Capsule by mouth every day. 30 Capsule 5    inFLIXimab (REMICADE) 100 MG Recon Soln Infuse  into a venous catheter. Every 5 weeks      Acetaminophen (TYLENOL PO) Take 2 Tabs by mouth as needed.       No current facility-administered medications for this visit.     Allergies   Allergen Reactions    Epinephrine Rash and Palpitations     .    Fish Anaphylaxis    Latex Rash     .      Other Misc Swelling     Tightness of the throat and systemic swelling     Shellfish Allergy Anaphylaxis    Food Hives     Tomatoes and walnuts cause blisters in mouth      Other Environmental Rash     Perfums, bleach, soaps  HOSPITAL LINENS    Fish Oil Anaphylaxis       Review of Systems   Constitutional: Negative for fever, chills.   HENT: Negative for congestion.    Eyes: Negative for pain.   "  Respiratory: Negative for shortness of breath.  Cardiovascular: Negative for leg swelling.   Gastrointestinal: Negative for diarrhea.   Genitourinary: Negative for hematuria.   Skin: Negative for rash.   Neurological: Negative for dizziness.   Endo/Heme/Allergies: Does not bleed easily.   Psychiatric/Behavioral: Negative for depression.  The patient is not nervous/anxious.      Objective:     /70 (BP Location: Left arm, Patient Position: Sitting, BP Cuff Size: Adult)   Pulse 76   Temp 37.1 °C (98.7 °F) (Temporal)   Resp 16   Ht 1.651 m (5' 5\")   Wt 76.1 kg (167 lb 12.8 oz)   LMP 07/01/2010   SpO2 98%   BMI 27.92 kg/m²   Body mass index is 27.92 kg/m².  Wt Readings from Last 4 Encounters:   03/20/23 76.1 kg (167 lb 12.8 oz)   03/13/23 75.3 kg (166 lb)   02/23/23 75.5 kg (166 lb 7.2 oz)   01/12/23 73.8 kg (162 lb 11.2 oz)       Physical Exam:  Constitutional: Well-developed and well-nourished. Not diaphoretic. No distress.   Skin: Skin is warm and dry. No rash noted.  Head: Atraumatic without lesions.  Eyes: Conjunctivae and extraocular motions are normal. Pupils are equal, round, and reactive to light. No scleral icterus.   Ears:  External ears unremarkable. Tympanic membranes clear and intact.  Mouth/Throat: Dentition is good. Tongue normal. Oropharynx is clear and moist. Posterior pharynx without erythema or exudates.  Neck: Supple, trachea midline. Normal range of motion. No thyromegaly present. No lymphadenopathy--cervical or supraclavicular.  Cardiovascular: Regular rate and rhythm, S1 and S2 without murmur, rubs, or gallops.  Lungs: Normal inspiratory effort, CTA bilaterally, no wheezes/rhonchi/rales  Abdomen: Soft, non tender, and without distention. Active bowel sounds in all four quadrants. No rebound, guarding, masses or HSM.  Extremities: No cyanosis, clubbing, erythema, nor edema. Distal pulses intact and symmetric.   Musculoskeletal: All major joints AROM full in all directions without " pain.  Neurological: Alert and oriented x 3. DTRs 2+/3 and symmetric. No cranial nerve deficit. 5/5 myotomes. Sensation intact.   Psychiatric:  Behavior, mood, and affect are appropriate.    Assessment and Plan:     1. Wellness examination  Lipid Profile      2. Need for vaccination  Influenza Vaccine Quad Injection (PF)          HCM:  up to date   Labs per orders  Immunizations per orders  Patient counseled about skin care, diet, supplements, prenatal vitamins, safe sex and exercise.    Referral for genetic research was offered. Patient declined.      Follow-up: Return in about 6 months (around 9/20/2023) for Pap.

## 2023-03-24 ENCOUNTER — TELEPHONE (OUTPATIENT)
Dept: HEALTH INFORMATION MANAGEMENT | Facility: OTHER | Age: 63
End: 2023-03-24
Payer: COMMERCIAL

## 2023-03-31 ENCOUNTER — APPOINTMENT (OUTPATIENT)
Dept: NEPHROLOGY | Facility: MEDICAL CENTER | Age: 63
End: 2023-03-31
Payer: COMMERCIAL

## 2023-04-06 ENCOUNTER — OUTPATIENT INFUSION SERVICES (OUTPATIENT)
Dept: ONCOLOGY | Facility: MEDICAL CENTER | Age: 63
End: 2023-04-06
Attending: NURSE PRACTITIONER
Payer: COMMERCIAL

## 2023-04-06 VITALS
OXYGEN SATURATION: 97 % | WEIGHT: 166.89 LBS | DIASTOLIC BLOOD PRESSURE: 78 MMHG | SYSTOLIC BLOOD PRESSURE: 119 MMHG | RESPIRATION RATE: 16 BRPM | HEIGHT: 65 IN | BODY MASS INDEX: 27.81 KG/M2 | TEMPERATURE: 97.4 F | HEART RATE: 78 BPM

## 2023-04-06 DIAGNOSIS — E55.9 VITAMIN D DEFICIENCY: ICD-10-CM

## 2023-04-06 DIAGNOSIS — N18.31 STAGE 3A CHRONIC KIDNEY DISEASE: ICD-10-CM

## 2023-04-06 DIAGNOSIS — Z00.00 WELLNESS EXAMINATION: ICD-10-CM

## 2023-04-06 DIAGNOSIS — N32.81 OAB (OVERACTIVE BLADDER): ICD-10-CM

## 2023-04-06 DIAGNOSIS — D86.9 SARCOIDOSIS: ICD-10-CM

## 2023-04-06 LAB
25(OH)D3 SERPL-MCNC: 26 NG/ML (ref 30–100)
ALBUMIN SERPL BCP-MCNC: 4.4 G/DL (ref 3.2–4.9)
ALBUMIN SERPL BCP-MCNC: 4.4 G/DL (ref 3.2–4.9)
ALBUMIN/GLOB SERPL: 1.3 G/DL
ALP SERPL-CCNC: 82 U/L (ref 30–99)
ALT SERPL-CCNC: 25 U/L (ref 2–50)
ANION GAP SERPL CALC-SCNC: 11 MMOL/L (ref 7–16)
APPEARANCE UR: CLEAR
AST SERPL-CCNC: 22 U/L (ref 12–45)
BACTERIA #/AREA URNS HPF: NEGATIVE /HPF
BASOPHILS # BLD AUTO: 1.3 % (ref 0–1.8)
BASOPHILS # BLD: 0.06 K/UL (ref 0–0.12)
BILIRUB SERPL-MCNC: 1.1 MG/DL (ref 0.1–1.5)
BILIRUB UR QL STRIP.AUTO: NEGATIVE
BUN SERPL-MCNC: 22 MG/DL (ref 8–22)
CALCIUM ALBUM COR SERPL-MCNC: 8.9 MG/DL (ref 8.5–10.5)
CALCIUM SERPL-MCNC: 9.2 MG/DL (ref 8.5–10.5)
CHLORIDE SERPL-SCNC: 104 MMOL/L (ref 96–112)
CHOLEST SERPL-MCNC: 261 MG/DL (ref 100–199)
CO2 SERPL-SCNC: 24 MMOL/L (ref 20–33)
COLOR UR: YELLOW
CREAT SERPL-MCNC: 1.09 MG/DL (ref 0.5–1.4)
CREAT UR-MCNC: 35.58 MG/DL
CRP SERPL HS-MCNC: <0.3 MG/DL (ref 0–0.75)
EOSINOPHIL # BLD AUTO: 0.3 K/UL (ref 0–0.51)
EOSINOPHIL NFR BLD: 6.3 % (ref 0–6.9)
EPI CELLS #/AREA URNS HPF: NEGATIVE /HPF
ERYTHROCYTE [DISTWIDTH] IN BLOOD BY AUTOMATED COUNT: 42.5 FL (ref 35.9–50)
ERYTHROCYTE [SEDIMENTATION RATE] IN BLOOD BY WESTERGREN METHOD: 18 MM/HOUR (ref 0–25)
GFR SERPLBLD CREATININE-BSD FMLA CKD-EPI: 58 ML/MIN/1.73 M 2
GLOBULIN SER CALC-MCNC: 3.3 G/DL (ref 1.9–3.5)
GLUCOSE SERPL-MCNC: 81 MG/DL (ref 65–99)
GLUCOSE UR STRIP.AUTO-MCNC: NEGATIVE MG/DL
HCT VFR BLD AUTO: 43.5 % (ref 37–47)
HDLC SERPL-MCNC: 85 MG/DL
HGB BLD-MCNC: 14.6 G/DL (ref 12–16)
HYALINE CASTS #/AREA URNS LPF: NORMAL /LPF
IMM GRANULOCYTES # BLD AUTO: 0.01 K/UL (ref 0–0.11)
IMM GRANULOCYTES NFR BLD AUTO: 0.2 % (ref 0–0.9)
KETONES UR STRIP.AUTO-MCNC: NEGATIVE MG/DL
LDLC SERPL CALC-MCNC: 158 MG/DL
LEUKOCYTE ESTERASE UR QL STRIP.AUTO: ABNORMAL
LYMPHOCYTES # BLD AUTO: 1.04 K/UL (ref 1–4.8)
LYMPHOCYTES NFR BLD: 21.7 % (ref 22–41)
MCH RBC QN AUTO: 31.9 PG (ref 27–33)
MCHC RBC AUTO-ENTMCNC: 33.6 G/DL (ref 33.6–35)
MCV RBC AUTO: 95.2 FL (ref 81.4–97.8)
MICRO URNS: ABNORMAL
MICROALBUMIN UR-MCNC: <1.2 MG/DL
MICROALBUMIN/CREAT UR: NORMAL MG/G (ref 0–30)
MONOCYTES # BLD AUTO: 0.44 K/UL (ref 0–0.85)
MONOCYTES NFR BLD AUTO: 9.2 % (ref 0–13.4)
NEUTROPHILS # BLD AUTO: 2.95 K/UL (ref 2–7.15)
NEUTROPHILS NFR BLD: 61.3 % (ref 44–72)
NITRITE UR QL STRIP.AUTO: NEGATIVE
NRBC # BLD AUTO: 0 K/UL
NRBC BLD-RTO: 0 /100 WBC
OUTPT INFUS CBC COMMENT OICOM: ABNORMAL
PH UR STRIP.AUTO: 6.5 [PH] (ref 5–8)
PHOSPHATE SERPL-MCNC: 3.7 MG/DL (ref 2.5–4.5)
PLATELET # BLD AUTO: 234 K/UL (ref 164–446)
PMV BLD AUTO: 9.5 FL (ref 9–12.9)
POTASSIUM SERPL-SCNC: 4.1 MMOL/L (ref 3.6–5.5)
PROT SERPL-MCNC: 7.7 G/DL (ref 6–8.2)
PROT UR QL STRIP: NEGATIVE MG/DL
PTH-INTACT SERPL-MCNC: 61.7 PG/ML (ref 14–72)
RBC # BLD AUTO: 4.57 M/UL (ref 4.2–5.4)
RBC # URNS HPF: NORMAL /HPF
RBC UR QL AUTO: NEGATIVE
SODIUM SERPL-SCNC: 139 MMOL/L (ref 135–145)
SP GR UR STRIP.AUTO: 1.01
TRIGL SERPL-MCNC: 88 MG/DL (ref 0–149)
UROBILINOGEN UR STRIP.AUTO-MCNC: 0.2 MG/DL
WBC # BLD AUTO: 4.8 K/UL (ref 4.8–10.8)
WBC #/AREA URNS HPF: NORMAL /HPF

## 2023-04-06 PROCEDURE — 82043 UR ALBUMIN QUANTITATIVE: CPT

## 2023-04-06 PROCEDURE — 85652 RBC SED RATE AUTOMATED: CPT

## 2023-04-06 PROCEDURE — 85025 COMPLETE CBC W/AUTO DIFF WBC: CPT

## 2023-04-06 PROCEDURE — 82040 ASSAY OF SERUM ALBUMIN: CPT

## 2023-04-06 PROCEDURE — 80061 LIPID PANEL: CPT

## 2023-04-06 PROCEDURE — 84156 ASSAY OF PROTEIN URINE: CPT

## 2023-04-06 PROCEDURE — 86140 C-REACTIVE PROTEIN: CPT

## 2023-04-06 PROCEDURE — 700111 HCHG RX REV CODE 636 W/ 250 OVERRIDE (IP): Performed by: NURSE PRACTITIONER

## 2023-04-06 PROCEDURE — 700105 HCHG RX REV CODE 258: Performed by: NURSE PRACTITIONER

## 2023-04-06 PROCEDURE — 82306 VITAMIN D 25 HYDROXY: CPT

## 2023-04-06 PROCEDURE — 83970 ASSAY OF PARATHORMONE: CPT

## 2023-04-06 PROCEDURE — 81001 URINALYSIS AUTO W/SCOPE: CPT

## 2023-04-06 PROCEDURE — 84100 ASSAY OF PHOSPHORUS: CPT

## 2023-04-06 PROCEDURE — 96413 CHEMO IV INFUSION 1 HR: CPT

## 2023-04-06 PROCEDURE — 82570 ASSAY OF URINE CREATININE: CPT

## 2023-04-06 PROCEDURE — 80053 COMPREHEN METABOLIC PANEL: CPT

## 2023-04-06 RX ORDER — ACETAMINOPHEN 325 MG/1
650 TABLET ORAL ONCE
Status: CANCELLED | OUTPATIENT
Start: 2023-04-27

## 2023-04-06 RX ORDER — ACETAMINOPHEN 325 MG/1
650 TABLET ORAL ONCE
Status: DISCONTINUED | OUTPATIENT
Start: 2023-04-06 | End: 2023-04-06 | Stop reason: HOSPADM

## 2023-04-06 RX ORDER — DIPHENHYDRAMINE HCL 25 MG
25 TABLET ORAL ONCE
Status: CANCELLED | OUTPATIENT
Start: 2023-04-27 | End: 2023-04-27

## 2023-04-06 RX ORDER — DIPHENHYDRAMINE HCL 25 MG
25 TABLET ORAL ONCE
Status: DISCONTINUED | OUTPATIENT
Start: 2023-04-06 | End: 2023-04-06 | Stop reason: HOSPADM

## 2023-04-06 RX ADMIN — INFLIXIMAB-AXXQ 400 MG: 100 INJECTION, POWDER, LYOPHILIZED, FOR SOLUTION INTRAVENOUS at 11:36

## 2023-04-06 ASSESSMENT — FIBROSIS 4 INDEX: FIB4 SCORE: 1.178737916978611884

## 2023-04-06 ASSESSMENT — PAIN DESCRIPTION - PAIN TYPE: TYPE: CHRONIC PAIN

## 2023-04-06 NOTE — PROGRESS NOTES
Pt arrives to [facility] for Infliximab for sarcoidosis.  Pt denies s/s of infection or worsening of symptoms.  Pt has chronic pain and rates pain 4/10 today.  Pt requests her labs from Dr. Marcano and lipid profile from Dr. Gilbert be drawn today.  PIV established to L- and labs drawn as ordered.   Infliximab infused over 1 hour without adverse reaction.  PIV flushed with NS and site removed.  Site wrapped with pressure dressing.  Urine sample collected and sent to the lab.  Email sent to scheduling dept to set up future appts.  Pt dc home to self care.

## 2023-04-07 LAB
CREAT UR-MCNC: 35.7 MG/DL
PROT UR-MCNC: 5 MG/DL (ref 0–15)
PROT/CREAT UR: 140 MG/G (ref 10–107)

## 2023-04-11 ENCOUNTER — OFFICE VISIT (OUTPATIENT)
Dept: NEPHROLOGY | Facility: MEDICAL CENTER | Age: 63
End: 2023-04-11
Payer: COMMERCIAL

## 2023-04-11 DIAGNOSIS — D86.9 SARCOIDOSIS: ICD-10-CM

## 2023-04-11 DIAGNOSIS — Z87.442 HISTORY OF NEPHROLITHIASIS: ICD-10-CM

## 2023-04-11 DIAGNOSIS — E55.9 VITAMIN D DEFICIENCY: ICD-10-CM

## 2023-04-11 DIAGNOSIS — N18.31 STAGE 3A CHRONIC KIDNEY DISEASE: ICD-10-CM

## 2023-04-11 PROCEDURE — 99214 OFFICE O/P EST MOD 30 MIN: CPT | Performed by: INTERNAL MEDICINE

## 2023-04-11 RX ORDER — ERGOCALCIFEROL 1.25 MG/1
50000 CAPSULE ORAL
Qty: 12 CAPSULE | Refills: 0 | Status: SHIPPED | OUTPATIENT
Start: 2023-04-11 | End: 2023-06-26

## 2023-04-11 ASSESSMENT — ENCOUNTER SYMPTOMS
FEVER: 0
SHORTNESS OF BREATH: 0
ABDOMINAL PAIN: 0

## 2023-04-11 ASSESSMENT — FIBROSIS 4 INDEX: FIB4 SCORE: 1.128205128205128205
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
## 2023-05-18 ENCOUNTER — OUTPATIENT INFUSION SERVICES (OUTPATIENT)
Dept: ONCOLOGY | Facility: MEDICAL CENTER | Age: 63
End: 2023-05-18
Attending: NURSE PRACTITIONER
Payer: COMMERCIAL

## 2023-05-18 VITALS
WEIGHT: 167.11 LBS | DIASTOLIC BLOOD PRESSURE: 71 MMHG | BODY MASS INDEX: 27.84 KG/M2 | OXYGEN SATURATION: 96 % | HEIGHT: 65 IN | TEMPERATURE: 97.6 F | RESPIRATION RATE: 18 BRPM | SYSTOLIC BLOOD PRESSURE: 122 MMHG | HEART RATE: 86 BPM

## 2023-05-18 DIAGNOSIS — D86.9 SARCOIDOSIS: ICD-10-CM

## 2023-05-18 PROCEDURE — 700105 HCHG RX REV CODE 258: Performed by: NURSE PRACTITIONER

## 2023-05-18 PROCEDURE — 700111 HCHG RX REV CODE 636 W/ 250 OVERRIDE (IP): Performed by: NURSE PRACTITIONER

## 2023-05-18 PROCEDURE — 96413 CHEMO IV INFUSION 1 HR: CPT

## 2023-05-18 RX ORDER — ACETAMINOPHEN 325 MG/1
650 TABLET ORAL ONCE
Status: CANCELLED | OUTPATIENT
Start: 2023-06-29

## 2023-05-18 RX ORDER — ACETAMINOPHEN 325 MG/1
650 TABLET ORAL ONCE
Status: DISCONTINUED | OUTPATIENT
Start: 2023-05-18 | End: 2023-05-18 | Stop reason: HOSPADM

## 2023-05-18 RX ORDER — DIPHENHYDRAMINE HCL 25 MG
25 TABLET ORAL ONCE
Status: CANCELLED | OUTPATIENT
Start: 2023-06-29 | End: 2023-06-29

## 2023-05-18 RX ORDER — DIPHENHYDRAMINE HCL 25 MG
25 TABLET ORAL ONCE
Status: DISCONTINUED | OUTPATIENT
Start: 2023-05-18 | End: 2023-05-18 | Stop reason: HOSPADM

## 2023-05-18 RX ADMIN — INFLIXIMAB-AXXQ 400 MG: 100 INJECTION, POWDER, LYOPHILIZED, FOR SOLUTION INTRAVENOUS at 14:16

## 2023-05-18 ASSESSMENT — PAIN DESCRIPTION - PAIN TYPE: TYPE: CHRONIC PAIN

## 2023-05-18 ASSESSMENT — FIBROSIS 4 INDEX: FIB4 SCORE: 1.128205128205128205

## 2023-05-18 NOTE — PROGRESS NOTES
Patient came to infusion independently. Orders and vital signs reviewed, assessment done. PIV started with blood return noted. Pt refused pretreatment medications. Avsola infused as ordered with no adverse events. PIV flushed post infusion with blood return noted. PIV removed with tip intact. Pt left in stable condition with next appointment confirmed.   
97.5

## 2023-06-26 ENCOUNTER — OFFICE VISIT (OUTPATIENT)
Dept: SLEEP MEDICINE | Facility: MEDICAL CENTER | Age: 63
End: 2023-06-26
Attending: INTERNAL MEDICINE
Payer: COMMERCIAL

## 2023-06-26 VITALS
HEART RATE: 73 BPM | OXYGEN SATURATION: 100 % | SYSTOLIC BLOOD PRESSURE: 118 MMHG | WEIGHT: 163 LBS | BODY MASS INDEX: 27.16 KG/M2 | DIASTOLIC BLOOD PRESSURE: 76 MMHG | HEIGHT: 65 IN

## 2023-06-26 DIAGNOSIS — D86.9 SARCOIDOSIS: ICD-10-CM

## 2023-06-26 DIAGNOSIS — R91.1 LUNG NODULE: ICD-10-CM

## 2023-06-26 PROCEDURE — 3074F SYST BP LT 130 MM HG: CPT | Performed by: INTERNAL MEDICINE

## 2023-06-26 PROCEDURE — 99204 OFFICE O/P NEW MOD 45 MIN: CPT | Performed by: INTERNAL MEDICINE

## 2023-06-26 PROCEDURE — 3078F DIAST BP <80 MM HG: CPT | Performed by: INTERNAL MEDICINE

## 2023-06-26 PROCEDURE — 99212 OFFICE O/P EST SF 10 MIN: CPT | Performed by: INTERNAL MEDICINE

## 2023-06-26 ASSESSMENT — ENCOUNTER SYMPTOMS
DEPRESSION: 0
CLAUDICATION: 0
NAUSEA: 0
DIAPHORESIS: 0
SINUS PAIN: 0
DOUBLE VISION: 0
STRIDOR: 0
SPUTUM PRODUCTION: 0
PALPITATIONS: 0
ABDOMINAL PAIN: 0
HEADACHES: 0
EYE DISCHARGE: 0
WEIGHT LOSS: 0
BLURRED VISION: 0
SPEECH CHANGE: 0
HEMOPTYSIS: 0
VOMITING: 0
COUGH: 0
CHILLS: 0
HEARTBURN: 0
DIZZINESS: 0
DIARRHEA: 0
CONSTIPATION: 0
PND: 0
FALLS: 0
WHEEZING: 0
TREMORS: 0
SHORTNESS OF BREATH: 0
ORTHOPNEA: 0
EYE REDNESS: 0
EYE PAIN: 0
NECK PAIN: 0
BACK PAIN: 0
WEAKNESS: 0
PHOTOPHOBIA: 0
FEVER: 0
FOCAL WEAKNESS: 0
MYALGIAS: 0
SORE THROAT: 0

## 2023-06-26 ASSESSMENT — FIBROSIS 4 INDEX: FIB4 SCORE: 1.128205128205128205
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
## 2023-06-29 ENCOUNTER — OUTPATIENT INFUSION SERVICES (OUTPATIENT)
Dept: ONCOLOGY | Facility: MEDICAL CENTER | Age: 63
End: 2023-06-29
Attending: STUDENT IN AN ORGANIZED HEALTH CARE EDUCATION/TRAINING PROGRAM
Payer: COMMERCIAL

## 2023-06-29 VITALS
BODY MASS INDEX: 27.18 KG/M2 | HEART RATE: 75 BPM | WEIGHT: 163.14 LBS | SYSTOLIC BLOOD PRESSURE: 126 MMHG | DIASTOLIC BLOOD PRESSURE: 81 MMHG | RESPIRATION RATE: 18 BRPM | TEMPERATURE: 96.8 F | OXYGEN SATURATION: 98 % | HEIGHT: 65 IN

## 2023-06-29 DIAGNOSIS — D86.9 SARCOIDOSIS: ICD-10-CM

## 2023-06-29 PROCEDURE — 96413 CHEMO IV INFUSION 1 HR: CPT

## 2023-06-29 PROCEDURE — 700111 HCHG RX REV CODE 636 W/ 250 OVERRIDE (IP): Mod: JZ | Performed by: NURSE PRACTITIONER

## 2023-06-29 PROCEDURE — 700105 HCHG RX REV CODE 258: Mod: JZ | Performed by: NURSE PRACTITIONER

## 2023-06-29 RX ORDER — ACETAMINOPHEN 325 MG/1
650 TABLET ORAL ONCE
Status: CANCELLED | OUTPATIENT
Start: 2023-07-20

## 2023-06-29 RX ORDER — DIPHENHYDRAMINE HCL 25 MG
25 TABLET ORAL ONCE
Status: CANCELLED | OUTPATIENT
Start: 2023-07-20 | End: 2023-07-20

## 2023-06-29 RX ORDER — ACETAMINOPHEN 325 MG/1
650 TABLET ORAL ONCE
Status: DISCONTINUED | OUTPATIENT
Start: 2023-06-29 | End: 2023-06-29 | Stop reason: HOSPADM

## 2023-06-29 RX ORDER — DIPHENHYDRAMINE HCL 25 MG
25 TABLET ORAL ONCE
Status: DISCONTINUED | OUTPATIENT
Start: 2023-06-29 | End: 2023-06-29 | Stop reason: HOSPADM

## 2023-06-29 RX ADMIN — INFLIXIMAB-AXXQ 400 MG: 100 INJECTION, POWDER, LYOPHILIZED, FOR SOLUTION INTRAVENOUS at 11:55

## 2023-06-29 ASSESSMENT — FIBROSIS 4 INDEX: FIB4 SCORE: 1.128205128205128205

## 2023-06-29 ASSESSMENT — PAIN DESCRIPTION - PAIN TYPE: TYPE: CHRONIC PAIN

## 2023-06-29 NOTE — PROGRESS NOTES
Pt presented to infusion center for Q 6 week Infliximab. Denies any current s/s of infection or open wounds. PIV started, brisk blood return observed. Pre-meds declined. Infliximab infused over 1 hour with filter in place with no s/s of adverse reaction. PIV flushed, removed and gauze and coban dressing placed. Emailed schedulers for next appt, left on foot to self care.

## 2023-07-05 ENCOUNTER — HOSPITAL ENCOUNTER (OUTPATIENT)
Dept: RADIOLOGY | Facility: MEDICAL CENTER | Age: 63
End: 2023-07-05
Attending: INTERNAL MEDICINE
Payer: COMMERCIAL

## 2023-07-05 DIAGNOSIS — R91.1 LUNG NODULE: ICD-10-CM

## 2023-07-05 DIAGNOSIS — D86.9 SARCOIDOSIS: ICD-10-CM

## 2023-07-05 PROCEDURE — 71250 CT THORAX DX C-: CPT

## 2023-08-18 ENCOUNTER — OUTPATIENT INFUSION SERVICES (OUTPATIENT)
Dept: ONCOLOGY | Facility: MEDICAL CENTER | Age: 63
End: 2023-08-18
Attending: NURSE PRACTITIONER
Payer: COMMERCIAL

## 2023-08-18 VITALS
WEIGHT: 167.33 LBS | OXYGEN SATURATION: 97 % | HEART RATE: 72 BPM | RESPIRATION RATE: 18 BRPM | DIASTOLIC BLOOD PRESSURE: 74 MMHG | SYSTOLIC BLOOD PRESSURE: 132 MMHG | TEMPERATURE: 97 F | BODY MASS INDEX: 27.88 KG/M2 | HEIGHT: 65 IN

## 2023-08-18 DIAGNOSIS — D86.9 SARCOIDOSIS: ICD-10-CM

## 2023-08-18 LAB
ALBUMIN SERPL BCP-MCNC: 4.5 G/DL (ref 3.2–4.9)
ALBUMIN/GLOB SERPL: 1.6 G/DL
ALP SERPL-CCNC: 74 U/L (ref 30–99)
ALT SERPL-CCNC: 25 U/L (ref 2–50)
ANION GAP SERPL CALC-SCNC: 12 MMOL/L (ref 7–16)
AST SERPL-CCNC: 24 U/L (ref 12–45)
BASOPHILS # BLD AUTO: 0.9 % (ref 0–1.8)
BASOPHILS # BLD: 0.06 K/UL (ref 0–0.12)
BILIRUB SERPL-MCNC: 0.9 MG/DL (ref 0.1–1.5)
BUN SERPL-MCNC: 22 MG/DL (ref 8–22)
CALCIUM ALBUM COR SERPL-MCNC: 8.8 MG/DL (ref 8.5–10.5)
CALCIUM SERPL-MCNC: 9.2 MG/DL (ref 8.5–10.5)
CHLORIDE SERPL-SCNC: 104 MMOL/L (ref 96–112)
CO2 SERPL-SCNC: 21 MMOL/L (ref 20–33)
CREAT SERPL-MCNC: 1.14 MG/DL (ref 0.5–1.4)
CRP SERPL HS-MCNC: <0.3 MG/DL (ref 0–0.75)
EOSINOPHIL # BLD AUTO: 0.31 K/UL (ref 0–0.51)
EOSINOPHIL NFR BLD: 4.4 % (ref 0–6.9)
ERYTHROCYTE [DISTWIDTH] IN BLOOD BY AUTOMATED COUNT: 44 FL (ref 35.9–50)
ERYTHROCYTE [SEDIMENTATION RATE] IN BLOOD BY WESTERGREN METHOD: 11 MM/HOUR (ref 0–25)
GFR SERPLBLD CREATININE-BSD FMLA CKD-EPI: 55 ML/MIN/1.73 M 2
GLOBULIN SER CALC-MCNC: 2.8 G/DL (ref 1.9–3.5)
GLUCOSE SERPL-MCNC: 92 MG/DL (ref 65–99)
HCT VFR BLD AUTO: 39.8 % (ref 37–47)
HGB BLD-MCNC: 13.4 G/DL (ref 12–16)
IMM GRANULOCYTES # BLD AUTO: 0.02 K/UL (ref 0–0.11)
IMM GRANULOCYTES NFR BLD AUTO: 0.3 % (ref 0–0.9)
LYMPHOCYTES # BLD AUTO: 1.23 K/UL (ref 1–4.8)
LYMPHOCYTES NFR BLD: 17.6 % (ref 22–41)
MCH RBC QN AUTO: 32.1 PG (ref 27–33)
MCHC RBC AUTO-ENTMCNC: 33.7 G/DL (ref 32.2–35.5)
MCV RBC AUTO: 95.2 FL (ref 81.4–97.8)
MONOCYTES # BLD AUTO: 0.64 K/UL (ref 0–0.85)
MONOCYTES NFR BLD AUTO: 9.1 % (ref 0–13.4)
NEUTROPHILS # BLD AUTO: 4.74 K/UL (ref 1.82–7.42)
NEUTROPHILS NFR BLD: 67.7 % (ref 44–72)
NRBC # BLD AUTO: 0 K/UL
NRBC BLD-RTO: 0 /100 WBC (ref 0–0.2)
OUTPT INFUS CBC COMMENT OICOM: ABNORMAL
PLATELET # BLD AUTO: 227 K/UL (ref 164–446)
PMV BLD AUTO: 9.3 FL (ref 9–12.9)
POTASSIUM SERPL-SCNC: 4.2 MMOL/L (ref 3.6–5.5)
PROT SERPL-MCNC: 7.3 G/DL (ref 6–8.2)
RBC # BLD AUTO: 4.18 M/UL (ref 4.2–5.4)
SODIUM SERPL-SCNC: 137 MMOL/L (ref 135–145)
WBC # BLD AUTO: 7 K/UL (ref 4.8–10.8)

## 2023-08-18 PROCEDURE — 96413 CHEMO IV INFUSION 1 HR: CPT

## 2023-08-18 PROCEDURE — 85025 COMPLETE CBC W/AUTO DIFF WBC: CPT

## 2023-08-18 PROCEDURE — 700111 HCHG RX REV CODE 636 W/ 250 OVERRIDE (IP): Mod: JZ | Performed by: NURSE PRACTITIONER

## 2023-08-18 PROCEDURE — 85652 RBC SED RATE AUTOMATED: CPT

## 2023-08-18 PROCEDURE — 80053 COMPREHEN METABOLIC PANEL: CPT

## 2023-08-18 PROCEDURE — 700105 HCHG RX REV CODE 258: Performed by: NURSE PRACTITIONER

## 2023-08-18 PROCEDURE — 86140 C-REACTIVE PROTEIN: CPT

## 2023-08-18 RX ORDER — DIPHENHYDRAMINE HCL 25 MG
25 TABLET ORAL ONCE
Status: CANCELLED | OUTPATIENT
Start: 2023-09-22 | End: 2023-09-22

## 2023-08-18 RX ORDER — ACETAMINOPHEN 325 MG/1
650 TABLET ORAL ONCE
Status: DISCONTINUED | OUTPATIENT
Start: 2023-08-18 | End: 2023-08-18 | Stop reason: HOSPADM

## 2023-08-18 RX ORDER — ACETAMINOPHEN 325 MG/1
650 TABLET ORAL ONCE
Status: CANCELLED | OUTPATIENT
Start: 2023-09-22

## 2023-08-18 RX ORDER — DIPHENHYDRAMINE HCL 25 MG
25 TABLET ORAL ONCE
Status: DISCONTINUED | OUTPATIENT
Start: 2023-08-18 | End: 2023-08-18 | Stop reason: HOSPADM

## 2023-08-18 RX ADMIN — INFLIXIMAB-AXXQ 400 MG: 100 INJECTION, POWDER, LYOPHILIZED, FOR SOLUTION INTRAVENOUS at 15:07

## 2023-08-18 ASSESSMENT — FIBROSIS 4 INDEX: FIB4 SCORE: 1.128205128205128205

## 2023-08-18 NOTE — PROGRESS NOTES
Pt arrives to [facility] for Infliximab for sarcoidosis.  Pt denies s/s of infection or worsening of symptoms.  Pt has chronic pain and rates pain 5/10 today.  Informed the pt that labs ordered to be drawn today.  PIV established to left hand and labs drawn as ordered.   Infliximab infused over 1 hour without adverse reaction.  PIV flushed with NS and site removed.  Site wrapped with pressure dressing.  Confirmed next appt time on 10/5/23 with pt.  Email sent to scheduling dept to set up future appts.  Pt dc home to self care.      none

## 2023-09-18 ENCOUNTER — OFFICE VISIT (OUTPATIENT)
Dept: MEDICAL GROUP | Facility: PHYSICIAN GROUP | Age: 63
End: 2023-09-18
Payer: COMMERCIAL

## 2023-09-18 ENCOUNTER — HOSPITAL ENCOUNTER (OUTPATIENT)
Facility: MEDICAL CENTER | Age: 63
End: 2023-09-18
Attending: FAMILY MEDICINE
Payer: COMMERCIAL

## 2023-09-18 VITALS
HEART RATE: 72 BPM | DIASTOLIC BLOOD PRESSURE: 72 MMHG | BODY MASS INDEX: 27.82 KG/M2 | WEIGHT: 167 LBS | TEMPERATURE: 97.9 F | SYSTOLIC BLOOD PRESSURE: 110 MMHG | HEIGHT: 65 IN | OXYGEN SATURATION: 99 %

## 2023-09-18 DIAGNOSIS — Z01.419 WELL WOMAN EXAM: Primary | ICD-10-CM

## 2023-09-18 DIAGNOSIS — Z12.11 COLON CANCER SCREENING: ICD-10-CM

## 2023-09-18 DIAGNOSIS — Z12.4 SCREENING FOR CERVICAL CANCER: ICD-10-CM

## 2023-09-18 DIAGNOSIS — Z11.51 SCREENING FOR HPV (HUMAN PAPILLOMAVIRUS): ICD-10-CM

## 2023-09-18 DIAGNOSIS — Z23 NEED FOR VACCINATION: ICD-10-CM

## 2023-09-18 PROCEDURE — 99396 PREV VISIT EST AGE 40-64: CPT | Mod: 25 | Performed by: FAMILY MEDICINE

## 2023-09-18 PROCEDURE — 90686 IIV4 VACC NO PRSV 0.5 ML IM: CPT | Performed by: FAMILY MEDICINE

## 2023-09-18 PROCEDURE — 3078F DIAST BP <80 MM HG: CPT | Performed by: FAMILY MEDICINE

## 2023-09-18 PROCEDURE — 87624 HPV HI-RISK TYP POOLED RSLT: CPT

## 2023-09-18 PROCEDURE — 87625 HPV TYPES 16 & 18 ONLY: CPT

## 2023-09-18 PROCEDURE — 90471 IMMUNIZATION ADMIN: CPT | Performed by: FAMILY MEDICINE

## 2023-09-18 PROCEDURE — 3074F SYST BP LT 130 MM HG: CPT | Performed by: FAMILY MEDICINE

## 2023-09-18 PROCEDURE — 88175 CYTOPATH C/V AUTO FLUID REDO: CPT

## 2023-09-18 ASSESSMENT — FIBROSIS 4 INDEX: FIB4 SCORE: 1.27

## 2023-09-18 NOTE — PROGRESS NOTES
Subjective:     CC:   Chief Complaint   Patient presents with    Gynecologic Exam     6 month follow up for PAP       HPI:   Alesia Lewis is a 62 y.o. female who presents for annual exam. She is feeling well and denies any complaints.    Ob-Gyn/ History:    Patient has GYN provider: no  /Para:  43  Last Pap Smear:  2019. No history of abnormal pap smears.  Gyn Surgery:   x1.  Current Contraceptive Method:  N/a. Yes currently sexually active.  Last menstrual period:  ~.  No significant bloating/fluid retention, pelvic pain. +dyspareunia - trying lubrication. No vaginal discharge  Post-menopausal bleeding: no  Urinary incontinence: +stress & urge - follows with urology  Folate intake: n/a     Health Maintenance  Advanced directive: n/a   Osteoporosis Screen/ DEXA: n/a   PT/vit D for falls prevention: n/a   Cholesterol Screenin2023 - T chol 261, TG 88, HDL 85,  ; ASCVD 2.1%  Diabetes Screenin2023 - non-fasting glucose 84   Aspirin Use: n/a      Anticipatory Guidance  Diet: trying, struggled with snacking   Exercise: just restarted - elliptical   Substance Abuse: no   Safe in relationship - .  Seat belts, bike helmet, gun safety discussed.  Sun protection used.  Dental home.     Cancer screening  Colorectal Cancer Screening: ordered by rheumatology   Lung Cancer Screening: n/a - never smoker    Cervical Cancer Screening: today   Breast Cancer Screening: due 2024    Infectious disease screening/Immunizations  --STI Screening: declined   --Practices safe sex.  --HIV Screening: reports neg in past   --Hepatitis C Screening: completed - neg ()   --Immunizations:    Influenza: today    HPV:  n/a    Tetanus: due     Shingles: recommended   Pneumococcal: n/a    Hepatitis B: n/a  COVID-19:  booster recommended  Other immunizations: n/a     She  has a past medical history of Allergy, Anesthesia, Chickenpox, Heart burn, Kidney stone, Mumps, Pain (2010), and  Sarcoidosis.    She has no past medical history of Cough, Painful breathing, Shortness of breath, Sputum production, or Wheezing.  She  has a past surgical history that includes other orthopedic surgery (1982); cholecystectomy (1999); bronchoscopy (5/18/2017); primary c section (1998); cervical disk and fusion anterior (8/24/2010); cervical fusion posterior (8/24/2010); sinusotomies (Right, 1984); and lumpectomy (Right, 2001).    Family History   Problem Relation Age of Onset    Asthma Mother     Other Mother         Hep C    Kidney Disease Mother         hepatorenal, s/p liver-kidney transplant    Heart Disease Father 61        MI, stent    Breast Cancer Maternal Aunt     Breast Cancer Maternal Aunt     Heart Disease Maternal Grandmother     Cancer Maternal Grandmother         leukemia    Kidney stones Maternal Grandfather     Hypertension Paternal Grandfather     Stroke Paternal Grandfather     Diabetes Neg Hx     Ovarian Cancer Neg Hx     Tubal Cancer Neg Hx     Peritoneal Cancer Neg Hx     Colorectal Cancer Neg Hx        Social History     Socioeconomic History    Marital status:      Spouse name: Not on file    Number of children: Not on file    Years of education: Not on file    Highest education level: Some college, no degree   Occupational History    Not on file   Tobacco Use    Smoking status: Never    Smokeless tobacco: Never   Vaping Use    Vaping Use: Never used   Substance and Sexual Activity    Alcohol use: No    Drug use: No    Sexual activity: Yes     Partners: Male     Birth control/protection: Male Sterilization     Comment:    Other Topics Concern    Not on file   Social History Narrative    Not on file     Social Determinants of Health     Financial Resource Strain: Low Risk  (9/18/2022)    Overall Financial Resource Strain (CARDIA)     Difficulty of Paying Living Expenses: Not very hard   Food Insecurity: No Food Insecurity (9/18/2022)    Hunger Vital Sign     Worried About Running  Out of Food in the Last Year: Never true     Ran Out of Food in the Last Year: Never true   Transportation Needs: No Transportation Needs (9/18/2022)    PRAPARE - Transportation     Lack of Transportation (Medical): No     Lack of Transportation (Non-Medical): No   Physical Activity: Insufficiently Active (9/18/2022)    Exercise Vital Sign     Days of Exercise per Week: 2 days     Minutes of Exercise per Session: 10 min   Stress: No Stress Concern Present (9/18/2022)    [employer] - Occupational Stress Questionnaire     Feeling of Stress : Only a little   Social Connections: Moderately Isolated (9/18/2022)    Social Connection and Isolation Panel [NHANES]     Frequency of Communication with Friends and Family: More than three times a week     Frequency of Social Gatherings with Friends and Family: Once a week     Attends Congregation Services: Never     Active Member of Clubs or Organizations: No     Attends Club or Organization Meetings: Never     Marital Status:    Intimate Partner Violence: Not on file   Housing Stability: Unknown (9/18/2022)    Housing Stability Vital Sign     Unable to Pay for Housing in the Last Year: No     Number of Places Lived in the Last Year: Not on file     Unstable Housing in the Last Year: No       Patient Active Problem List    Diagnosis Date Noted    History of nephrolithiasis 04/11/2023    History of COVID-19 12/29/2020    De Quervain's tenosynovitis, right 10/23/2020    Stage 3a chronic kidney disease (HCC) 10/23/2020    Sarcoidosis 07/31/2020         Current Outpatient Medications   Medication Sig Dispense Refill    hydroxychloroquine (PLAQUENIL) 200 MG Tab 400mg (2 tabs) Monday through Friday and 200mg (1 tab) Saturdays and sundays 180 Tablet 1    omeprazole (PRILOSEC) 40 MG delayed-release capsule Take 1 Capsule by mouth every day. 30 Capsule 5    inFLIXimab (REMICADE) 100 MG Recon Soln Infuse  into a venous catheter. Every 5 weeks       "Acetaminophen (TYLENOL PO) Take 2 Tabs by mouth as needed.       No current facility-administered medications for this visit.     Allergies   Allergen Reactions    Epinephrine Rash and Palpitations     .    Fish Anaphylaxis    Latex Rash     .      Other Misc Swelling     Tightness of the throat and systemic swelling     Shellfish Allergy Anaphylaxis    Food Hives     Tomatoes and walnuts cause blisters in mouth      Other Environmental Rash     Perfums, bleach, soaps  HOSPITAL LINENS    Fish Oil Anaphylaxis       Review of Systems   Constitutional: Negative for fever, chills.   HENT: Negative for congestion.    Eyes: Negative for pain.    Respiratory: Negative for shortness of breath.  Cardiovascular: Negative for leg swelling.   Gastrointestinal: Negative for diarrhea.   Genitourinary: Negative for hematuria.   Skin: Negative for rash.   Neurological: Negative for dizziness.   Endo/Heme/Allergies: Does not bleed easily.   Psychiatric/Behavioral: Negative for depression.  The patient is not nervous/anxious.      Objective:     /72 (BP Location: Left arm, Patient Position: Sitting, BP Cuff Size: Adult long)   Pulse 72   Temp 36.6 °C (97.9 °F) (Temporal)   Ht 1.651 m (5' 5\")   Wt 75.8 kg (167 lb)   LMP 07/01/2010   SpO2 99%   BMI 27.79 kg/m²   Body mass index is 27.79 kg/m².  Wt Readings from Last 4 Encounters:   09/18/23 75.8 kg (167 lb)   09/14/23 77.2 kg (170 lb 3.1 oz)   08/18/23 75.9 kg (167 lb 5.3 oz)   06/29/23 74 kg (163 lb 2.3 oz)       Physical Exam:  Constitutional: Well-developed and well-nourished. Not diaphoretic. No distress.   Skin: Skin is warm and dry. No rash noted.  Head: Atraumatic without lesions.  Neck: Supple, trachea midline. Normal range of motion. No thyromegaly present. No lymphadenopathy--cervical or supraclavicular.  Cardiovascular: Regular rate and rhythm, S1 and S2 without murmur, rubs, or gallops.  Lungs: Normal inspiratory effort, CTA bilaterally, no " wheezes/rhonchi/rales  :Perineum and external genitalia normal without rash. Vagina with scant discharge. Cervix without visible lesions or discharge.  Extremities: No cyanosis, clubbing, erythema, nor edema. Distal pulses intact and symmetric.   Psychiatric:  Behavior, mood, and affect are appropriate.    Assessment and Plan:     1. Well woman exam        2. Screening for cervical cancer  THINPREP PAP WITH HPV      3. Screening for HPV (human papillomavirus)  THINPREP PAP WITH HPV      4. Colon cancer screening  Referral to GI for Colonoscopy      5. Need for vaccination  INFLUENZA VACCINE QUAD INJ (PF)        HCM:  up to date   Labs per orders  Immunizations per orders  Patient counseled about skin care, diet, supplements, prenatal vitamins, safe sex and exercise.    Referral for genetic research was offered. Patient declined.      Follow-up: Return in about 6 months (around 3/18/2024) for Annual/wellness visit.

## 2023-09-19 DIAGNOSIS — Z11.51 SCREENING FOR HPV (HUMAN PAPILLOMAVIRUS): ICD-10-CM

## 2023-09-19 DIAGNOSIS — Z12.4 SCREENING FOR CERVICAL CANCER: ICD-10-CM

## 2023-09-21 LAB
CYTOLOGIST CVX/VAG CYTO: ABNORMAL
CYTOLOGY CVX/VAG DOC CYTO: ABNORMAL
CYTOLOGY CVX/VAG DOC THIN PREP: ABNORMAL
HPV I/H RISK 4 DNA CVX QL PROBE+SIG AMP: POSITIVE
HPV16 DNA CVX QL PROBE+SIG AMP: NEGATIVE
HPV18+45 E6+E7 MRNA CVX QL NAA+PROBE: NEGATIVE
NOTE NL11727A: ABNORMAL
OTHER STN SPEC: ABNORMAL
STAT OF ADQ CVX/VAG CYTO-IMP: ABNORMAL

## 2023-10-05 ENCOUNTER — OUTPATIENT INFUSION SERVICES (OUTPATIENT)
Dept: ONCOLOGY | Facility: MEDICAL CENTER | Age: 63
End: 2023-10-05
Attending: NURSE PRACTITIONER
Payer: COMMERCIAL

## 2023-10-05 VITALS
WEIGHT: 167.11 LBS | BODY MASS INDEX: 26.86 KG/M2 | OXYGEN SATURATION: 96 % | RESPIRATION RATE: 18 BRPM | SYSTOLIC BLOOD PRESSURE: 129 MMHG | TEMPERATURE: 97 F | DIASTOLIC BLOOD PRESSURE: 73 MMHG | HEIGHT: 66 IN | HEART RATE: 76 BPM

## 2023-10-05 DIAGNOSIS — D86.9 SARCOIDOSIS: ICD-10-CM

## 2023-10-05 PROCEDURE — 96413 CHEMO IV INFUSION 1 HR: CPT

## 2023-10-05 PROCEDURE — 700105 HCHG RX REV CODE 258: Performed by: INTERNAL MEDICINE

## 2023-10-05 PROCEDURE — 700111 HCHG RX REV CODE 636 W/ 250 OVERRIDE (IP): Mod: JZ | Performed by: INTERNAL MEDICINE

## 2023-10-05 RX ORDER — ACETAMINOPHEN 325 MG/1
650 TABLET ORAL ONCE
Status: CANCELLED | OUTPATIENT
Start: 2023-11-09

## 2023-10-05 RX ORDER — ACETAMINOPHEN 325 MG/1
650 TABLET ORAL ONCE
Status: DISCONTINUED | OUTPATIENT
Start: 2023-10-05 | End: 2023-10-05 | Stop reason: HOSPADM

## 2023-10-05 RX ORDER — DIPHENHYDRAMINE HCL 25 MG
25 TABLET ORAL ONCE
Status: DISCONTINUED | OUTPATIENT
Start: 2023-10-05 | End: 2023-10-05 | Stop reason: HOSPADM

## 2023-10-05 RX ORDER — DIPHENHYDRAMINE HCL 25 MG
25 TABLET ORAL ONCE
Status: CANCELLED | OUTPATIENT
Start: 2023-11-09 | End: 2023-11-09

## 2023-10-05 RX ADMIN — INFLIXIMAB-AXXQ 400 MG: 100 INJECTION, POWDER, LYOPHILIZED, FOR SOLUTION INTRAVENOUS at 11:55

## 2023-10-05 ASSESSMENT — FIBROSIS 4 INDEX: FIB4 SCORE: 1.27

## 2023-10-05 NOTE — PROGRESS NOTES
ey into [facility] for Infliximab for Sarcoidosis. Pt denied having any new or acute complaints today, reports tolerating past treatments well. PIV started, had + blood return flushed briskly. Pt given Avsola as prescribed, tolerated well, denied having any complaints during or after infusion. PIV discontinued, bleeding controlled with gauze and coban. Discharge home to self care in [facility]. Appointment confirm for next treatment.

## 2023-11-23 ENCOUNTER — OUTPATIENT INFUSION SERVICES (OUTPATIENT)
Dept: ONCOLOGY | Facility: MEDICAL CENTER | Age: 63
End: 2023-11-23
Attending: INTERNAL MEDICINE
Payer: COMMERCIAL

## 2023-11-23 VITALS
DIASTOLIC BLOOD PRESSURE: 77 MMHG | RESPIRATION RATE: 18 BRPM | OXYGEN SATURATION: 98 % | SYSTOLIC BLOOD PRESSURE: 125 MMHG | HEART RATE: 75 BPM | TEMPERATURE: 96.8 F | BODY MASS INDEX: 26.96 KG/M2 | HEIGHT: 66 IN | WEIGHT: 167.77 LBS

## 2023-11-23 DIAGNOSIS — D86.9 SARCOIDOSIS: ICD-10-CM

## 2023-11-23 LAB
ALBUMIN SERPL BCP-MCNC: 4.3 G/DL (ref 3.2–4.9)
ALBUMIN/GLOB SERPL: 1.4 G/DL
ALP SERPL-CCNC: 75 U/L (ref 30–99)
ALT SERPL-CCNC: 23 U/L (ref 2–50)
ANION GAP SERPL CALC-SCNC: 11 MMOL/L (ref 7–16)
AST SERPL-CCNC: 19 U/L (ref 12–45)
BASOPHILS # BLD AUTO: 1.2 % (ref 0–1.8)
BASOPHILS # BLD: 0.06 K/UL (ref 0–0.12)
BILIRUB SERPL-MCNC: 0.7 MG/DL (ref 0.1–1.5)
BUN SERPL-MCNC: 25 MG/DL (ref 8–22)
CALCIUM ALBUM COR SERPL-MCNC: 8.8 MG/DL (ref 8.5–10.5)
CALCIUM SERPL-MCNC: 9 MG/DL (ref 8.5–10.5)
CHLORIDE SERPL-SCNC: 104 MMOL/L (ref 96–112)
CO2 SERPL-SCNC: 25 MMOL/L (ref 20–33)
CREAT SERPL-MCNC: 1.22 MG/DL (ref 0.5–1.4)
CRP SERPL HS-MCNC: <0.3 MG/DL (ref 0–0.75)
EOSINOPHIL # BLD AUTO: 0.28 K/UL (ref 0–0.51)
EOSINOPHIL NFR BLD: 5.6 % (ref 0–6.9)
ERYTHROCYTE [DISTWIDTH] IN BLOOD BY AUTOMATED COUNT: 41.2 FL (ref 35.9–50)
ERYTHROCYTE [SEDIMENTATION RATE] IN BLOOD BY WESTERGREN METHOD: 13 MM/HOUR (ref 0–25)
GFR SERPLBLD CREATININE-BSD FMLA CKD-EPI: 51 ML/MIN/1.73 M 2
GLOBULIN SER CALC-MCNC: 3.1 G/DL (ref 1.9–3.5)
GLUCOSE SERPL-MCNC: 87 MG/DL (ref 65–99)
HCT VFR BLD AUTO: 42.5 % (ref 37–47)
HGB BLD-MCNC: 14.1 G/DL (ref 12–16)
IMM GRANULOCYTES # BLD AUTO: 0.01 K/UL (ref 0–0.11)
IMM GRANULOCYTES NFR BLD AUTO: 0.2 % (ref 0–0.9)
LYMPHOCYTES # BLD AUTO: 1.12 K/UL (ref 1–4.8)
LYMPHOCYTES NFR BLD: 22.6 % (ref 22–41)
MCH RBC QN AUTO: 31.2 PG (ref 27–33)
MCHC RBC AUTO-ENTMCNC: 33.2 G/DL (ref 32.2–35.5)
MCV RBC AUTO: 94 FL (ref 81.4–97.8)
MONOCYTES # BLD AUTO: 0.4 K/UL (ref 0–0.85)
MONOCYTES NFR BLD AUTO: 8.1 % (ref 0–13.4)
NEUTROPHILS # BLD AUTO: 3.09 K/UL (ref 1.82–7.42)
NEUTROPHILS NFR BLD: 62.3 % (ref 44–72)
NRBC # BLD AUTO: 0 K/UL
NRBC BLD-RTO: 0 /100 WBC (ref 0–0.2)
OUTPT INFUS CBC COMMENT OICOM: NORMAL
PLATELET # BLD AUTO: 239 K/UL (ref 164–446)
PMV BLD AUTO: 9.3 FL (ref 9–12.9)
POTASSIUM SERPL-SCNC: 4.4 MMOL/L (ref 3.6–5.5)
PROT SERPL-MCNC: 7.4 G/DL (ref 6–8.2)
RBC # BLD AUTO: 4.52 M/UL (ref 4.2–5.4)
SODIUM SERPL-SCNC: 140 MMOL/L (ref 135–145)
WBC # BLD AUTO: 5 K/UL (ref 4.8–10.8)

## 2023-11-23 PROCEDURE — 80053 COMPREHEN METABOLIC PANEL: CPT

## 2023-11-23 PROCEDURE — 96413 CHEMO IV INFUSION 1 HR: CPT

## 2023-11-23 PROCEDURE — 700111 HCHG RX REV CODE 636 W/ 250 OVERRIDE (IP): Mod: JZ | Performed by: INTERNAL MEDICINE

## 2023-11-23 PROCEDURE — 85025 COMPLETE CBC W/AUTO DIFF WBC: CPT

## 2023-11-23 PROCEDURE — 85652 RBC SED RATE AUTOMATED: CPT

## 2023-11-23 PROCEDURE — 86140 C-REACTIVE PROTEIN: CPT

## 2023-11-23 PROCEDURE — 700105 HCHG RX REV CODE 258: Performed by: INTERNAL MEDICINE

## 2023-11-23 RX ORDER — ACETAMINOPHEN 325 MG/1
650 TABLET ORAL ONCE
Status: CANCELLED | OUTPATIENT
Start: 2023-12-28

## 2023-11-23 RX ORDER — DIPHENHYDRAMINE HCL 25 MG
25 TABLET ORAL ONCE
Status: DISCONTINUED | OUTPATIENT
Start: 2023-11-23 | End: 2023-11-23 | Stop reason: HOSPADM

## 2023-11-23 RX ORDER — DIPHENHYDRAMINE HCL 25 MG
25 TABLET ORAL ONCE
Status: CANCELLED | OUTPATIENT
Start: 2023-12-28 | End: 2023-12-28

## 2023-11-23 RX ORDER — ACETAMINOPHEN 325 MG/1
650 TABLET ORAL ONCE
Status: DISCONTINUED | OUTPATIENT
Start: 2023-11-23 | End: 2023-11-23 | Stop reason: HOSPADM

## 2023-11-23 RX ADMIN — INFLIXIMAB-AXXQ 400 MG: 100 INJECTION, POWDER, LYOPHILIZED, FOR SOLUTION INTRAVENOUS at 12:02

## 2023-11-23 ASSESSMENT — FIBROSIS 4 INDEX: FIB4 SCORE: 1.27

## 2023-11-23 NOTE — PROGRESS NOTES
Pt arrived ambulatory to [facility] for Q 6 week Infliximab infusion for Sarcoidosis. POC discussed with pt and she agrees with plan.     PIV established, brisk blood return noted, labs drawn as ordered. Pt medicated per MAR. Pt declined pre-meds. Infliximab infused over 1 hour. PIV dc'd catheter tip intact, gauze and coban dressing applied.     Pt discharged to self care, [facility]. Pt's next appt confirmed 1/11/2024.

## 2024-01-11 ENCOUNTER — OUTPATIENT INFUSION SERVICES (OUTPATIENT)
Dept: ONCOLOGY | Facility: MEDICAL CENTER | Age: 64
End: 2024-01-11
Attending: NURSE PRACTITIONER
Payer: COMMERCIAL

## 2024-01-11 VITALS
OXYGEN SATURATION: 98 % | WEIGHT: 167.55 LBS | BODY MASS INDEX: 26.93 KG/M2 | TEMPERATURE: 97.4 F | HEART RATE: 78 BPM | DIASTOLIC BLOOD PRESSURE: 80 MMHG | RESPIRATION RATE: 18 BRPM | HEIGHT: 66 IN | SYSTOLIC BLOOD PRESSURE: 116 MMHG

## 2024-01-11 DIAGNOSIS — D86.9 SARCOIDOSIS: ICD-10-CM

## 2024-01-11 LAB
HBV CORE AB SERPL QL IA: NONREACTIVE
HBV SURFACE AG SER QL: NORMAL

## 2024-01-11 PROCEDURE — 86704 HEP B CORE ANTIBODY TOTAL: CPT

## 2024-01-11 PROCEDURE — 87340 HEPATITIS B SURFACE AG IA: CPT

## 2024-01-11 PROCEDURE — 700105 HCHG RX REV CODE 258: Performed by: INTERNAL MEDICINE

## 2024-01-11 PROCEDURE — 86480 TB TEST CELL IMMUN MEASURE: CPT

## 2024-01-11 PROCEDURE — 700111 HCHG RX REV CODE 636 W/ 250 OVERRIDE (IP): Mod: JZ | Performed by: INTERNAL MEDICINE

## 2024-01-11 PROCEDURE — 96413 CHEMO IV INFUSION 1 HR: CPT

## 2024-01-11 RX ORDER — DIPHENHYDRAMINE HCL 25 MG
25 TABLET ORAL ONCE
Status: DISCONTINUED | OUTPATIENT
Start: 2024-01-11 | End: 2024-01-11 | Stop reason: HOSPADM

## 2024-01-11 RX ORDER — ACETAMINOPHEN 325 MG/1
650 TABLET ORAL ONCE
Status: DISCONTINUED | OUTPATIENT
Start: 2024-01-11 | End: 2024-01-11 | Stop reason: HOSPADM

## 2024-01-11 RX ORDER — DIPHENHYDRAMINE HCL 25 MG
25 TABLET ORAL ONCE
Status: CANCELLED | OUTPATIENT
Start: 2024-02-15 | End: 2024-02-15

## 2024-01-11 RX ORDER — ACETAMINOPHEN 325 MG/1
650 TABLET ORAL ONCE
Status: CANCELLED | OUTPATIENT
Start: 2024-02-15

## 2024-01-11 RX ADMIN — INFLIXIMAB-AXXQ 400 MG: 100 INJECTION, POWDER, LYOPHILIZED, FOR SOLUTION INTRAVENOUS at 12:04

## 2024-01-11 ASSESSMENT — FIBROSIS 4 INDEX: FIB4 SCORE: 1.01

## 2024-01-12 NOTE — PROGRESS NOTES
Pt presented to infusion center for Q 6 week Infliximab. Denies any current s/s of infection or open wounds. PIV started, brisk blood return observed. Declined pre-meds. Infliximab infused over 1 hour with filter in place with no s/s of adverse reaction. PIV flushed, removed and gauze and coban dressing placed. Emailed schedulers for next appt, left on foot to self care.

## 2024-01-15 LAB
GAMMA INTERFERON BACKGROUND BLD IA-ACNC: 0.07 IU/ML
M TB IFN-G BLD-IMP: NEGATIVE
M TB IFN-G CD4+ BCKGRND COR BLD-ACNC: 0 IU/ML
MITOGEN IGNF BCKGRD COR BLD-ACNC: 3.68 IU/ML
QFT TB2 - NIL TBQ2: 0 IU/ML

## 2024-01-17 ENCOUNTER — OFFICE VISIT (OUTPATIENT)
Dept: SLEEP MEDICINE | Facility: MEDICAL CENTER | Age: 64
End: 2024-01-17
Attending: INTERNAL MEDICINE
Payer: COMMERCIAL

## 2024-01-17 VITALS
HEIGHT: 65 IN | HEART RATE: 80 BPM | SYSTOLIC BLOOD PRESSURE: 110 MMHG | WEIGHT: 160 LBS | BODY MASS INDEX: 26.66 KG/M2 | DIASTOLIC BLOOD PRESSURE: 66 MMHG | OXYGEN SATURATION: 96 %

## 2024-01-17 DIAGNOSIS — R91.1 LUNG NODULE: ICD-10-CM

## 2024-01-17 DIAGNOSIS — M79.10 MYALGIA: ICD-10-CM

## 2024-01-17 DIAGNOSIS — D86.9 SARCOIDOSIS: ICD-10-CM

## 2024-01-17 PROCEDURE — 99212 OFFICE O/P EST SF 10 MIN: CPT | Performed by: INTERNAL MEDICINE

## 2024-01-17 PROCEDURE — 3074F SYST BP LT 130 MM HG: CPT | Performed by: INTERNAL MEDICINE

## 2024-01-17 PROCEDURE — 3078F DIAST BP <80 MM HG: CPT | Performed by: INTERNAL MEDICINE

## 2024-01-17 PROCEDURE — 99214 OFFICE O/P EST MOD 30 MIN: CPT | Performed by: INTERNAL MEDICINE

## 2024-01-17 ASSESSMENT — ENCOUNTER SYMPTOMS
DIZZINESS: 0
SHORTNESS OF BREATH: 0
DOUBLE VISION: 0
DIARRHEA: 0
WHEEZING: 0
DIAPHORESIS: 0
EYE DISCHARGE: 0
BACK PAIN: 0
HEARTBURN: 0
WEIGHT LOSS: 0
DEPRESSION: 0
HEADACHES: 0
ORTHOPNEA: 0
COUGH: 0
WEAKNESS: 0
CLAUDICATION: 0
SORE THROAT: 0
SPUTUM PRODUCTION: 0
PND: 0
SINUS PAIN: 0
CONSTIPATION: 0
FALLS: 0
VOMITING: 0
FEVER: 0
FOCAL WEAKNESS: 0
NECK PAIN: 0
EYE PAIN: 0
CHILLS: 0
BLURRED VISION: 0
MYALGIAS: 0
EYE REDNESS: 0
SPEECH CHANGE: 0
TREMORS: 0
ABDOMINAL PAIN: 0
PALPITATIONS: 0
NAUSEA: 0
HEMOPTYSIS: 0
STRIDOR: 0
PHOTOPHOBIA: 0

## 2024-01-17 ASSESSMENT — PATIENT HEALTH QUESTIONNAIRE - PHQ9: CLINICAL INTERPRETATION OF PHQ2 SCORE: 0

## 2024-01-17 ASSESSMENT — FIBROSIS 4 INDEX: FIB4 SCORE: 1.01
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
## 2024-01-24 ENCOUNTER — HOSPITAL ENCOUNTER (OUTPATIENT)
Dept: RADIOLOGY | Facility: MEDICAL CENTER | Age: 64
End: 2024-01-24
Attending: INTERNAL MEDICINE
Payer: COMMERCIAL

## 2024-01-24 DIAGNOSIS — R91.1 LUNG NODULE: ICD-10-CM

## 2024-01-24 DIAGNOSIS — D86.9 SARCOIDOSIS: ICD-10-CM

## 2024-01-24 PROCEDURE — 71250 CT THORAX DX C-: CPT

## 2024-02-16 ENCOUNTER — PATIENT MESSAGE (OUTPATIENT)
Dept: HEALTH INFORMATION MANAGEMENT | Facility: OTHER | Age: 64
End: 2024-02-16

## 2024-02-24 ENCOUNTER — OUTPATIENT INFUSION SERVICES (OUTPATIENT)
Dept: ONCOLOGY | Facility: MEDICAL CENTER | Age: 64
End: 2024-02-24
Attending: NURSE PRACTITIONER
Payer: COMMERCIAL

## 2024-02-24 VITALS
WEIGHT: 165.34 LBS | BODY MASS INDEX: 26.57 KG/M2 | TEMPERATURE: 97.6 F | SYSTOLIC BLOOD PRESSURE: 148 MMHG | RESPIRATION RATE: 18 BRPM | OXYGEN SATURATION: 98 % | HEIGHT: 66 IN | DIASTOLIC BLOOD PRESSURE: 75 MMHG | HEART RATE: 83 BPM

## 2024-02-24 DIAGNOSIS — M79.10 MYALGIA: ICD-10-CM

## 2024-02-24 DIAGNOSIS — D86.9 SARCOIDOSIS: ICD-10-CM

## 2024-02-24 LAB
ALBUMIN SERPL BCP-MCNC: 4.2 G/DL (ref 3.2–4.9)
ALBUMIN/GLOB SERPL: 1.4 G/DL
ALP SERPL-CCNC: 76 U/L (ref 30–99)
ALT SERPL-CCNC: 15 U/L (ref 2–50)
ANION GAP SERPL CALC-SCNC: 11 MMOL/L (ref 7–16)
AST SERPL-CCNC: 18 U/L (ref 12–45)
BASOPHILS # BLD AUTO: 1 % (ref 0–1.8)
BASOPHILS # BLD: 0.06 K/UL (ref 0–0.12)
BILIRUB SERPL-MCNC: 0.8 MG/DL (ref 0.1–1.5)
BUN SERPL-MCNC: 22 MG/DL (ref 8–22)
CALCIUM ALBUM COR SERPL-MCNC: 9.1 MG/DL (ref 8.5–10.5)
CALCIUM SERPL-MCNC: 9.3 MG/DL (ref 8.5–10.5)
CHLORIDE SERPL-SCNC: 102 MMOL/L (ref 96–112)
CO2 SERPL-SCNC: 23 MMOL/L (ref 20–33)
CREAT SERPL-MCNC: 1.06 MG/DL (ref 0.5–1.4)
CRP SERPL HS-MCNC: <0.3 MG/DL (ref 0–0.75)
EOSINOPHIL # BLD AUTO: 0.32 K/UL (ref 0–0.51)
EOSINOPHIL NFR BLD: 5.1 % (ref 0–6.9)
ERYTHROCYTE [DISTWIDTH] IN BLOOD BY AUTOMATED COUNT: 40.2 FL (ref 35.9–50)
ERYTHROCYTE [SEDIMENTATION RATE] IN BLOOD BY WESTERGREN METHOD: 11 MM/HOUR (ref 0–25)
GFR SERPLBLD CREATININE-BSD FMLA CKD-EPI: 60 ML/MIN/1.73 M 2
GLOBULIN SER CALC-MCNC: 3.1 G/DL (ref 1.9–3.5)
GLUCOSE SERPL-MCNC: 88 MG/DL (ref 65–99)
HCT VFR BLD AUTO: 40.4 % (ref 37–47)
HGB BLD-MCNC: 14.1 G/DL (ref 12–16)
IMM GRANULOCYTES # BLD AUTO: 0.01 K/UL (ref 0–0.11)
IMM GRANULOCYTES NFR BLD AUTO: 0.2 % (ref 0–0.9)
LYMPHOCYTES # BLD AUTO: 1.49 K/UL (ref 1–4.8)
LYMPHOCYTES NFR BLD: 23.7 % (ref 22–41)
MCH RBC QN AUTO: 32.1 PG (ref 27–33)
MCHC RBC AUTO-ENTMCNC: 34.9 G/DL (ref 32.2–35.5)
MCV RBC AUTO: 92 FL (ref 81.4–97.8)
MONOCYTES # BLD AUTO: 0.57 K/UL (ref 0–0.85)
MONOCYTES NFR BLD AUTO: 9 % (ref 0–13.4)
NEUTROPHILS # BLD AUTO: 3.85 K/UL (ref 1.82–7.42)
NEUTROPHILS NFR BLD: 61 % (ref 44–72)
NRBC # BLD AUTO: 0 K/UL
NRBC BLD-RTO: 0 /100 WBC (ref 0–0.2)
OUTPT INFUS CBC COMMENT OICOM: NORMAL
PLATELET # BLD AUTO: 229 K/UL (ref 164–446)
PMV BLD AUTO: 9.9 FL (ref 9–12.9)
POTASSIUM SERPL-SCNC: 4 MMOL/L (ref 3.6–5.5)
PROT SERPL-MCNC: 7.3 G/DL (ref 6–8.2)
RBC # BLD AUTO: 4.39 M/UL (ref 4.2–5.4)
SODIUM SERPL-SCNC: 136 MMOL/L (ref 135–145)
WBC # BLD AUTO: 6.3 K/UL (ref 4.8–10.8)

## 2024-02-24 PROCEDURE — 96413 CHEMO IV INFUSION 1 HR: CPT

## 2024-02-24 PROCEDURE — 85652 RBC SED RATE AUTOMATED: CPT

## 2024-02-24 PROCEDURE — 85025 COMPLETE CBC W/AUTO DIFF WBC: CPT

## 2024-02-24 PROCEDURE — 86140 C-REACTIVE PROTEIN: CPT

## 2024-02-24 PROCEDURE — 700105 HCHG RX REV CODE 258: Performed by: INTERNAL MEDICINE

## 2024-02-24 PROCEDURE — 82552 ASSAY OF CPK IN BLOOD: CPT

## 2024-02-24 PROCEDURE — 82550 ASSAY OF CK (CPK): CPT

## 2024-02-24 PROCEDURE — 700111 HCHG RX REV CODE 636 W/ 250 OVERRIDE (IP): Mod: JZ | Performed by: INTERNAL MEDICINE

## 2024-02-24 PROCEDURE — 80053 COMPREHEN METABOLIC PANEL: CPT

## 2024-02-24 RX ORDER — ACETAMINOPHEN 325 MG/1
650 TABLET ORAL ONCE
Status: DISCONTINUED | OUTPATIENT
Start: 2024-02-24 | End: 2024-02-24 | Stop reason: HOSPADM

## 2024-02-24 RX ORDER — ACETAMINOPHEN 325 MG/1
650 TABLET ORAL ONCE
OUTPATIENT
Start: 2024-04-06

## 2024-02-24 RX ORDER — DIPHENHYDRAMINE HCL 25 MG
25 TABLET ORAL ONCE
OUTPATIENT
Start: 2024-04-06 | End: 2024-04-06

## 2024-02-24 RX ORDER — DIPHENHYDRAMINE HCL 25 MG
25 TABLET ORAL ONCE
Status: DISCONTINUED | OUTPATIENT
Start: 2024-02-24 | End: 2024-02-24 | Stop reason: HOSPADM

## 2024-02-24 RX ADMIN — INFLIXIMAB-AXXQ 400 MG: 100 INJECTION, POWDER, LYOPHILIZED, FOR SOLUTION INTRAVENOUS at 13:59

## 2024-02-24 ASSESSMENT — FIBROSIS 4 INDEX: FIB4 SCORE: 1.01

## 2024-02-24 NOTE — PROGRESS NOTES
Alesia into [facility] for Avsola. Pt denied having any new or acute complaints today, reports tolerating past treatments well. PIV started, had + blood return flushed briskly. Pt given Avsola as prescribed, tolerated well, denied having any complaints during or after infusion. PIV discontinued, bleeding controlled with gauze and coban. Next appointment scheduled, Alesia discharged home to self care.

## 2024-02-28 LAB
CK BB CFR SERPL ELPH: 0 % (ref 0–0)
CK MACRO1 CFR SERPL: 0 % (ref 0–0)
CK MACRO2 CFR SERPL: 0 % (ref 0–0)
CK MB CFR SERPL ELPH: 0 % (ref 0–4)
CK MM CFR SERPL ELPH: 100 % (ref 96–100)
CK SERPL-CCNC: 96 U/L (ref 26–192)

## 2024-03-18 ENCOUNTER — OFFICE VISIT (OUTPATIENT)
Dept: MEDICAL GROUP | Facility: PHYSICIAN GROUP | Age: 64
End: 2024-03-18
Payer: COMMERCIAL

## 2024-03-18 VITALS
DIASTOLIC BLOOD PRESSURE: 70 MMHG | HEIGHT: 66 IN | OXYGEN SATURATION: 97 % | BODY MASS INDEX: 26.78 KG/M2 | WEIGHT: 166.6 LBS | HEART RATE: 71 BPM | TEMPERATURE: 97.5 F | SYSTOLIC BLOOD PRESSURE: 108 MMHG

## 2024-03-18 DIAGNOSIS — R21 RASH: Primary | ICD-10-CM

## 2024-03-18 DIAGNOSIS — N18.31 STAGE 3A CHRONIC KIDNEY DISEASE: ICD-10-CM

## 2024-03-18 DIAGNOSIS — M65.4 DE QUERVAIN'S TENOSYNOVITIS, RIGHT: ICD-10-CM

## 2024-03-18 DIAGNOSIS — D86.9 SARCOIDOSIS: ICD-10-CM

## 2024-03-18 PROCEDURE — 99214 OFFICE O/P EST MOD 30 MIN: CPT | Performed by: FAMILY MEDICINE

## 2024-03-18 PROCEDURE — 3078F DIAST BP <80 MM HG: CPT | Performed by: FAMILY MEDICINE

## 2024-03-18 PROCEDURE — 3074F SYST BP LT 130 MM HG: CPT | Performed by: FAMILY MEDICINE

## 2024-03-18 RX ORDER — TRIAMCINOLONE ACETONIDE 1 MG/G
1 CREAM TOPICAL 2 TIMES DAILY
Qty: 15 G | Refills: 0 | Status: SHIPPED | OUTPATIENT
Start: 2024-03-18

## 2024-03-18 ASSESSMENT — FIBROSIS 4 INDEX: FIB4 SCORE: 1.24

## 2024-03-18 NOTE — PROGRESS NOTES
"Verbal consent was acquired by the patient to use [employer] ambient listening note generation during this visit    Subjective:     HPI:   History of Present Illness  The patient presents for evaluation of multiple medical concerns.    She has a sore on her left lower leg that has been present for a couple of months. It is itchy, and she wakes up in the middle of the night with itching. She denies any pain. She denies any oozing. It has not changed since she first noticed it. She has tried cortisone, which helps with the itching at night, but it has not resolved it. It has not spread. She does not remember any injury to the area. She used to get sarcoid all the time.    She is still taking Plaquenil 1.5 tablets every day for sarcoidosis. She is also on Remicade. She is hoping to taper off Remicade.    She had an injection in her right wrist, which did not work. She went to a specialist, Dr. Logan, who put a splint on her wrist when she is knitting. She had an x-ray, which showed arthritis. She is not interested in surgery.    She called for a colonoscopy, but they did not get it. Dr. Steward called another referral. She has not had the shingles vaccine yet. She denies any chest pain or trouble breathing in the last few days. She denies any fevers or chills.    Health Maintenance: Completed    Objective:     Exam:  /70 (BP Location: Left arm, Patient Position: Sitting, BP Cuff Size: Adult)   Pulse 71   Temp 36.4 °C (97.5 °F) (Temporal)   Ht 1.67 m (5' 5.75\")   Wt 75.6 kg (166 lb 9.6 oz)   LMP 07/01/2010   SpO2 97%   BMI 27.09 kg/m²  Body mass index is 27.09 kg/m².    Physical Exam  Constitutional:       Appearance: Normal appearance.   Cardiovascular:      Rate and Rhythm: Normal rate and regular rhythm.      Heart sounds: Normal heart sounds.   Pulmonary:      Effort: Pulmonary effort is normal.      Breath sounds: Normal breath sounds.   Musculoskeletal:      Cervical back: Normal range of motion and " neck supple.   Lymphadenopathy:      Cervical: No cervical adenopathy.   Skin:         Neurological:      Mental Status: She is alert.             Results  Laboratory Studies  Labs were reviewed with the patient.    Assessment & Plan:     1. Rash        2. Sarcoidosis        3. De Quervain's tenosynovitis, right        4. Stage 3a chronic kidney disease (HCC)            Assessment & Plan  1. Rash.  This is chronic and stable for a couple of months. She has had an itchy red patch on her left lower shin. Over the counter hydrocortisone cream helps with the itch, but not so much resolving the rash. We will try a stronger steroid cream today, triamcinolone 0.1 percent twice daily until rash resolves or no more than 14 days. If at the end of the 14 days the rash is not gone, she will contact me.    2. Sarcoidosis.  This is chronic and controlled. She will continue Plaquenil 300 mg daily and Remicade 100 mg every 5 weeks and follow up with rheumatology as directed.    3. De Quervain's tenosynovitis on the right side.  This is chronic and stable. She has seen a sports medicine specialist and tried an injection and gotten x-rays and neither has been extremely helpful. Symptoms are mainly only aggravated by knitting, and she will wear a wrist brace at that time, which works fairly well. She will continue using the wrist brace as needed for the activities that trigger the pain.    4. Stage 3 chronic kidney disease.  This is chronic and stable. Recent labs actually show a slight improvement in her GFR. . She is up to date with all the other lab work, so we will continue to monitor this yearly. We will continue to avoid nephrotoxic agents.    Follow-up  The patient will follow up in 6 months for an annual with a repeat Pap smear.    Please note that this dictation was created using voice recognition software. I have made every reasonable attempt to correct obvious errors, but I expect that there are errors of grammar and  possibly content that I did not discover before finalizing the note.

## 2024-04-06 ENCOUNTER — OUTPATIENT INFUSION SERVICES (OUTPATIENT)
Dept: ONCOLOGY | Facility: MEDICAL CENTER | Age: 64
End: 2024-04-06
Attending: NURSE PRACTITIONER
Payer: COMMERCIAL

## 2024-04-06 VITALS
DIASTOLIC BLOOD PRESSURE: 78 MMHG | TEMPERATURE: 97.2 F | WEIGHT: 164.9 LBS | SYSTOLIC BLOOD PRESSURE: 112 MMHG | BODY MASS INDEX: 26.5 KG/M2 | HEART RATE: 81 BPM | RESPIRATION RATE: 18 BRPM | OXYGEN SATURATION: 97 % | HEIGHT: 66 IN

## 2024-04-06 DIAGNOSIS — D86.9 SARCOIDOSIS: ICD-10-CM

## 2024-04-06 PROCEDURE — 700111 HCHG RX REV CODE 636 W/ 250 OVERRIDE (IP): Mod: JZ | Performed by: INTERNAL MEDICINE

## 2024-04-06 PROCEDURE — 700105 HCHG RX REV CODE 258: Performed by: INTERNAL MEDICINE

## 2024-04-06 PROCEDURE — 96413 CHEMO IV INFUSION 1 HR: CPT

## 2024-04-06 RX ORDER — DIPHENHYDRAMINE HCL 25 MG
25 TABLET ORAL ONCE
OUTPATIENT
Start: 2024-05-18 | End: 2024-05-18

## 2024-04-06 RX ORDER — ACETAMINOPHEN 325 MG/1
650 TABLET ORAL ONCE
Status: DISCONTINUED | OUTPATIENT
Start: 2024-04-06 | End: 2024-04-06 | Stop reason: HOSPADM

## 2024-04-06 RX ORDER — ACETAMINOPHEN 325 MG/1
650 TABLET ORAL ONCE
OUTPATIENT
Start: 2024-05-18

## 2024-04-06 RX ORDER — DIPHENHYDRAMINE HCL 25 MG
25 TABLET ORAL ONCE
Status: DISCONTINUED | OUTPATIENT
Start: 2024-04-06 | End: 2024-04-06 | Stop reason: HOSPADM

## 2024-04-06 RX ADMIN — INFLIXIMAB-AXXQ 400 MG: 100 INJECTION, POWDER, LYOPHILIZED, FOR SOLUTION INTRAVENOUS at 11:54

## 2024-04-06 ASSESSMENT — FIBROSIS 4 INDEX: FIB4 SCORE: 1.24

## 2024-04-06 ASSESSMENT — PAIN DESCRIPTION - PAIN TYPE: TYPE: CHRONIC PAIN

## 2024-04-06 NOTE — PROGRESS NOTES
[facility] for Avsola for sarcoidosis ambulating with a steady gait. Pt denies recent infection, illness or wounds. Pt c/o LLE dry skin, itchy and scaly area, MD aware, kenalog cream PRN. POC reviewed.    IV started in L FA x 1 attempt, brisk blood return noted. IVF TKO.     Pt declined pre-meds, education provided. Avsola administered per MD order over 1 hour, filter intact, Pt tolerated well/no s/sx of reaction noted. Line flushed clear, IV removed, tip intact/sterile gauze and coban applied.     Confirmed next appointment and Alesia was discharged home in no acute distress.

## 2024-04-10 ENCOUNTER — APPOINTMENT (RX ONLY)
Dept: URBAN - METROPOLITAN AREA CLINIC 4 | Facility: CLINIC | Age: 64
Setting detail: DERMATOLOGY
End: 2024-04-10

## 2024-04-10 DIAGNOSIS — R21 RASH AND OTHER NONSPECIFIC SKIN ERUPTION: ICD-10-CM | Status: INADEQUATELY CONTROLLED

## 2024-04-10 PROCEDURE — 99204 OFFICE O/P NEW MOD 45 MIN: CPT

## 2024-04-10 PROCEDURE — ? DIAGNOSIS COMMENT

## 2024-04-10 PROCEDURE — ? PRESCRIPTION

## 2024-04-10 PROCEDURE — ? COUNSELING

## 2024-04-10 PROCEDURE — ? PHOTO-DOCUMENTATION

## 2024-04-10 RX ORDER — HALOBETASOL PROPIONATE 0.5 MG/G
OINTMENT TOPICAL
Qty: 15 | Refills: 0 | Status: ERX | COMMUNITY
Start: 2024-04-10

## 2024-04-10 RX ADMIN — HALOBETASOL PROPIONATE: 0.5 OINTMENT TOPICAL at 00:00

## 2024-04-10 ASSESSMENT — LOCATION ZONE DERM: LOCATION ZONE: LEG

## 2024-04-10 ASSESSMENT — LOCATION SIMPLE DESCRIPTION DERM: LOCATION SIMPLE: LEFT PRETIBIAL REGION

## 2024-04-10 ASSESSMENT — LOCATION DETAILED DESCRIPTION DERM: LOCATION DETAILED: LEFT DISTAL PRETIBIAL REGION

## 2024-04-10 NOTE — PROCEDURE: DIAGNOSIS COMMENT
Detail Level: Detailed
Comment: Patient has a history of sarcoidosis that affects skin, liver, kidneys. She has a history of recurrent sarcoidosis rashes on skin that usually resolve on their own and do not present like this. She is taking hydrochloroquine and Remicade infusions. 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 Will treat with topical halobetasol BID x 2 weeks, f/u after use.
Render Risk Assessment In Note?: no

## 2024-05-18 ENCOUNTER — APPOINTMENT (OUTPATIENT)
Dept: ONCOLOGY | Facility: MEDICAL CENTER | Age: 64
End: 2024-05-18
Payer: COMMERCIAL

## 2024-05-25 ENCOUNTER — OUTPATIENT INFUSION SERVICES (OUTPATIENT)
Dept: ONCOLOGY | Facility: MEDICAL CENTER | Age: 64
End: 2024-05-25
Attending: NURSE PRACTITIONER
Payer: COMMERCIAL

## 2024-05-25 VITALS
TEMPERATURE: 96.9 F | BODY MASS INDEX: 27 KG/M2 | HEIGHT: 66 IN | DIASTOLIC BLOOD PRESSURE: 78 MMHG | RESPIRATION RATE: 18 BRPM | WEIGHT: 167.99 LBS | SYSTOLIC BLOOD PRESSURE: 130 MMHG | HEART RATE: 74 BPM | OXYGEN SATURATION: 99 %

## 2024-05-25 DIAGNOSIS — D86.9 SARCOIDOSIS: ICD-10-CM

## 2024-05-25 LAB
ALBUMIN SERPL BCP-MCNC: 4.2 G/DL (ref 3.2–4.9)
ALBUMIN/GLOB SERPL: 1.6 G/DL
ALP SERPL-CCNC: 78 U/L (ref 30–99)
ALT SERPL-CCNC: 22 U/L (ref 2–50)
ANION GAP SERPL CALC-SCNC: 14 MMOL/L (ref 7–16)
AST SERPL-CCNC: 21 U/L (ref 12–45)
BASOPHILS # BLD AUTO: 1.4 % (ref 0–1.8)
BASOPHILS # BLD: 0.07 K/UL (ref 0–0.12)
BILIRUB SERPL-MCNC: 0.8 MG/DL (ref 0.1–1.5)
BUN SERPL-MCNC: 23 MG/DL (ref 8–22)
CALCIUM ALBUM COR SERPL-MCNC: 8.9 MG/DL (ref 8.5–10.5)
CALCIUM SERPL-MCNC: 9.1 MG/DL (ref 8.5–10.5)
CHLORIDE SERPL-SCNC: 105 MMOL/L (ref 96–112)
CO2 SERPL-SCNC: 21 MMOL/L (ref 20–33)
CREAT SERPL-MCNC: 0.93 MG/DL (ref 0.5–1.4)
CRP SERPL HS-MCNC: <0.3 MG/DL (ref 0–0.75)
EOSINOPHIL # BLD AUTO: 0.29 K/UL (ref 0–0.51)
EOSINOPHIL NFR BLD: 5.8 % (ref 0–6.9)
ERYTHROCYTE [DISTWIDTH] IN BLOOD BY AUTOMATED COUNT: 42 FL (ref 35.9–50)
ERYTHROCYTE [SEDIMENTATION RATE] IN BLOOD BY WESTERGREN METHOD: 14 MM/HOUR (ref 0–25)
GFR SERPLBLD CREATININE-BSD FMLA CKD-EPI: 70 ML/MIN/1.73 M 2
GLOBULIN SER CALC-MCNC: 2.7 G/DL (ref 1.9–3.5)
GLUCOSE SERPL-MCNC: 92 MG/DL (ref 65–99)
HCT VFR BLD AUTO: 41.2 % (ref 37–47)
HGB BLD-MCNC: 13.8 G/DL (ref 12–16)
IMM GRANULOCYTES # BLD AUTO: 0.02 K/UL (ref 0–0.11)
IMM GRANULOCYTES NFR BLD AUTO: 0.4 % (ref 0–0.9)
LYMPHOCYTES # BLD AUTO: 1.3 K/UL (ref 1–4.8)
LYMPHOCYTES NFR BLD: 25.9 % (ref 22–41)
MCH RBC QN AUTO: 31.4 PG (ref 27–33)
MCHC RBC AUTO-ENTMCNC: 33.5 G/DL (ref 32.2–35.5)
MCV RBC AUTO: 93.8 FL (ref 81.4–97.8)
MONOCYTES # BLD AUTO: 0.38 K/UL (ref 0–0.85)
MONOCYTES NFR BLD AUTO: 7.6 % (ref 0–13.4)
NEUTROPHILS # BLD AUTO: 2.95 K/UL (ref 1.82–7.42)
NEUTROPHILS NFR BLD: 58.9 % (ref 44–72)
NRBC # BLD AUTO: 0 K/UL
NRBC BLD-RTO: 0 /100 WBC (ref 0–0.2)
OUTPT INFUS CBC COMMENT OICOM: NORMAL
PLATELET # BLD AUTO: 231 K/UL (ref 164–446)
PMV BLD AUTO: 9.6 FL (ref 9–12.9)
POTASSIUM SERPL-SCNC: 4.1 MMOL/L (ref 3.6–5.5)
PROT SERPL-MCNC: 6.9 G/DL (ref 6–8.2)
RBC # BLD AUTO: 4.39 M/UL (ref 4.2–5.4)
SODIUM SERPL-SCNC: 140 MMOL/L (ref 135–145)
WBC # BLD AUTO: 5 K/UL (ref 4.8–10.8)

## 2024-05-25 RX ORDER — DIPHENHYDRAMINE HCL 25 MG
25 TABLET ORAL ONCE
OUTPATIENT
Start: 2024-06-29 | End: 2024-06-29

## 2024-05-25 RX ORDER — ACETAMINOPHEN 325 MG/1
650 TABLET ORAL ONCE
OUTPATIENT
Start: 2024-06-29

## 2024-05-25 RX ADMIN — INFLIXIMAB-AXXQ 400 MG: 100 INJECTION, POWDER, LYOPHILIZED, FOR SOLUTION INTRAVENOUS at 11:03

## 2024-05-25 ASSESSMENT — FIBROSIS 4 INDEX: FIB4 SCORE: 1.24

## 2024-05-25 NOTE — PROGRESS NOTES
Alesia came into infusion services today for avsola infusion. No complaints. Hep B and TB within 2 years and no live vaccines within the past 4 weeks. PIV started in the right forearm, +blood return, q12 week labs drawn, flushed well. Pt refused pre-meds. Avsola given as prescribed. Tolerated well. PIV removed, covered with gauze and coban. Pt has future appointments. Discharged to self care.

## 2024-06-29 ENCOUNTER — APPOINTMENT (OUTPATIENT)
Dept: ONCOLOGY | Facility: MEDICAL CENTER | Age: 64
End: 2024-06-29
Payer: COMMERCIAL

## 2024-07-06 ENCOUNTER — OUTPATIENT INFUSION SERVICES (OUTPATIENT)
Dept: ONCOLOGY | Facility: MEDICAL CENTER | Age: 64
End: 2024-07-06
Attending: NURSE PRACTITIONER
Payer: COMMERCIAL

## 2024-07-06 VITALS
TEMPERATURE: 96.5 F | RESPIRATION RATE: 18 BRPM | DIASTOLIC BLOOD PRESSURE: 73 MMHG | OXYGEN SATURATION: 99 % | WEIGHT: 168.65 LBS | HEIGHT: 66 IN | SYSTOLIC BLOOD PRESSURE: 123 MMHG | HEART RATE: 70 BPM | BODY MASS INDEX: 27.1 KG/M2

## 2024-07-06 DIAGNOSIS — D86.9 SARCOIDOSIS: ICD-10-CM

## 2024-07-06 PROCEDURE — 96413 CHEMO IV INFUSION 1 HR: CPT

## 2024-07-06 PROCEDURE — 700105 HCHG RX REV CODE 258: Performed by: INTERNAL MEDICINE

## 2024-07-06 PROCEDURE — 700111 HCHG RX REV CODE 636 W/ 250 OVERRIDE (IP): Mod: JZ | Performed by: INTERNAL MEDICINE

## 2024-07-06 RX ORDER — ACETAMINOPHEN 325 MG/1
650 TABLET ORAL ONCE
OUTPATIENT
Start: 2024-08-17

## 2024-07-06 RX ORDER — ACETAMINOPHEN 325 MG/1
650 TABLET ORAL ONCE
Status: DISCONTINUED | OUTPATIENT
Start: 2024-07-06 | End: 2024-07-06 | Stop reason: HOSPADM

## 2024-07-06 RX ORDER — DIPHENHYDRAMINE HCL 25 MG
25 TABLET ORAL ONCE
OUTPATIENT
Start: 2024-08-17 | End: 2024-08-17

## 2024-07-06 RX ORDER — DIPHENHYDRAMINE HCL 25 MG
25 TABLET ORAL ONCE
Status: DISCONTINUED | OUTPATIENT
Start: 2024-07-06 | End: 2024-07-06 | Stop reason: HOSPADM

## 2024-07-06 RX ADMIN — INFLIXIMAB-AXXQ 400 MG: 100 INJECTION, POWDER, LYOPHILIZED, FOR SOLUTION INTRAVENOUS at 11:58

## 2024-07-06 ASSESSMENT — FIBROSIS 4 INDEX: FIB4 SCORE: 1.18

## 2024-07-17 ENCOUNTER — APPOINTMENT (OUTPATIENT)
Dept: SLEEP MEDICINE | Facility: MEDICAL CENTER | Age: 64
End: 2024-07-17
Attending: INTERNAL MEDICINE
Payer: COMMERCIAL

## 2024-08-29 ENCOUNTER — OUTPATIENT INFUSION SERVICES (OUTPATIENT)
Dept: ONCOLOGY | Facility: MEDICAL CENTER | Age: 64
End: 2024-08-29
Attending: NURSE PRACTITIONER
Payer: COMMERCIAL

## 2024-08-29 VITALS
SYSTOLIC BLOOD PRESSURE: 121 MMHG | DIASTOLIC BLOOD PRESSURE: 71 MMHG | HEIGHT: 66 IN | OXYGEN SATURATION: 96 % | WEIGHT: 165.34 LBS | TEMPERATURE: 98 F | BODY MASS INDEX: 26.57 KG/M2 | RESPIRATION RATE: 18 BRPM | HEART RATE: 68 BPM

## 2024-08-29 DIAGNOSIS — D86.9 SARCOIDOSIS: ICD-10-CM

## 2024-08-29 PROCEDURE — 96365 THER/PROPH/DIAG IV INF INIT: CPT

## 2024-08-29 PROCEDURE — 700111 HCHG RX REV CODE 636 W/ 250 OVERRIDE (IP): Mod: JZ | Performed by: INTERNAL MEDICINE

## 2024-08-29 PROCEDURE — 700105 HCHG RX REV CODE 258: Performed by: INTERNAL MEDICINE

## 2024-08-29 RX ORDER — DIPHENHYDRAMINE HCL 25 MG
25 TABLET ORAL ONCE
OUTPATIENT
Start: 2024-11-07 | End: 2024-11-07

## 2024-08-29 RX ORDER — ACETAMINOPHEN 325 MG/1
650 TABLET ORAL ONCE
Status: DISCONTINUED | OUTPATIENT
Start: 2024-08-29 | End: 2024-08-29 | Stop reason: HOSPADM

## 2024-08-29 RX ORDER — DIPHENHYDRAMINE HCL 25 MG
25 TABLET ORAL ONCE
Status: DISCONTINUED | OUTPATIENT
Start: 2024-08-29 | End: 2024-08-29 | Stop reason: HOSPADM

## 2024-08-29 RX ORDER — ACETAMINOPHEN 325 MG/1
650 TABLET ORAL ONCE
OUTPATIENT
Start: 2024-11-07

## 2024-08-29 RX ADMIN — INFLIXIMAB-AXXQ 400 MG: 100 INJECTION, POWDER, LYOPHILIZED, FOR SOLUTION INTRAVENOUS at 17:07

## 2024-08-29 ASSESSMENT — FIBROSIS 4 INDEX: FIB4 SCORE: 1.18

## 2024-08-30 NOTE — PROGRESS NOTES
Alesia came into infusion services today for avsola infusion. No complaints. Hep B and TB within 2 years and no live vaccines within the past 4 weeks. PIV started in the left AC by Misty MENDEZ, +blood return, flushed well. Pt refused pre-meds. Avsola given as prescribed. Tolerated well. PIV removed, covered with gauze and coban. Pt has future appointments. Discharged to self care.

## 2024-10-04 RX ORDER — ACETAMINOPHEN 500 MG
500 TABLET ORAL ONCE
Status: CANCELLED | OUTPATIENT
Start: 2024-10-20

## 2024-10-04 RX ORDER — DIPHENHYDRAMINE HCL 25 MG
25 TABLET ORAL ONCE
Status: CANCELLED | OUTPATIENT
Start: 2024-10-20 | End: 2024-10-20

## 2024-10-26 ENCOUNTER — OUTPATIENT INFUSION SERVICES (OUTPATIENT)
Dept: ONCOLOGY | Facility: MEDICAL CENTER | Age: 64
End: 2024-10-26
Attending: INTERNAL MEDICINE
Payer: COMMERCIAL

## 2024-10-26 VITALS
OXYGEN SATURATION: 96 % | HEIGHT: 66 IN | TEMPERATURE: 97.5 F | RESPIRATION RATE: 18 BRPM | WEIGHT: 167.77 LBS | HEART RATE: 83 BPM | DIASTOLIC BLOOD PRESSURE: 70 MMHG | BODY MASS INDEX: 26.96 KG/M2 | SYSTOLIC BLOOD PRESSURE: 106 MMHG

## 2024-10-26 DIAGNOSIS — D86.9 SARCOIDOSIS: ICD-10-CM

## 2024-10-26 LAB
ALBUMIN SERPL BCP-MCNC: 4.2 G/DL (ref 3.2–4.9)
ALBUMIN/GLOB SERPL: 1.5 G/DL
ALP SERPL-CCNC: 75 U/L (ref 30–99)
ALT SERPL-CCNC: 22 U/L (ref 2–50)
ANION GAP SERPL CALC-SCNC: 11 MMOL/L (ref 7–16)
AST SERPL-CCNC: 20 U/L (ref 12–45)
BASOPHILS # BLD AUTO: 1 % (ref 0–1.8)
BASOPHILS # BLD: 0.06 K/UL (ref 0–0.12)
BILIRUB SERPL-MCNC: 0.8 MG/DL (ref 0.1–1.5)
BUN SERPL-MCNC: 19 MG/DL (ref 8–22)
CALCIUM ALBUM COR SERPL-MCNC: 8.8 MG/DL (ref 8.5–10.5)
CALCIUM SERPL-MCNC: 9 MG/DL (ref 8.5–10.5)
CHLORIDE SERPL-SCNC: 108 MMOL/L (ref 96–112)
CO2 SERPL-SCNC: 25 MMOL/L (ref 20–33)
CREAT SERPL-MCNC: 1.01 MG/DL (ref 0.5–1.4)
CRP SERPL HS-MCNC: <0.3 MG/DL (ref 0–0.75)
EOSINOPHIL # BLD AUTO: 0.29 K/UL (ref 0–0.51)
EOSINOPHIL NFR BLD: 5 % (ref 0–6.9)
ERYTHROCYTE [DISTWIDTH] IN BLOOD BY AUTOMATED COUNT: 43.5 FL (ref 35.9–50)
ERYTHROCYTE [SEDIMENTATION RATE] IN BLOOD BY WESTERGREN METHOD: 16 MM/HOUR (ref 0–25)
GFR SERPLBLD CREATININE-BSD FMLA CKD-EPI: 63 ML/MIN/1.73 M 2
GLOBULIN SER CALC-MCNC: 2.8 G/DL (ref 1.9–3.5)
GLUCOSE SERPL-MCNC: 142 MG/DL (ref 65–99)
HCT VFR BLD AUTO: 40.5 % (ref 37–47)
HGB BLD-MCNC: 13.4 G/DL (ref 12–16)
IMM GRANULOCYTES # BLD AUTO: 0.01 K/UL (ref 0–0.11)
IMM GRANULOCYTES NFR BLD AUTO: 0.2 % (ref 0–0.9)
LYMPHOCYTES # BLD AUTO: 1.33 K/UL (ref 1–4.8)
LYMPHOCYTES NFR BLD: 23 % (ref 22–41)
MCH RBC QN AUTO: 32.1 PG (ref 27–33)
MCHC RBC AUTO-ENTMCNC: 33.1 G/DL (ref 32.2–35.5)
MCV RBC AUTO: 96.9 FL (ref 81.4–97.8)
MONOCYTES # BLD AUTO: 0.45 K/UL (ref 0–0.85)
MONOCYTES NFR BLD AUTO: 7.8 % (ref 0–13.4)
NEUTROPHILS # BLD AUTO: 3.65 K/UL (ref 1.82–7.42)
NEUTROPHILS NFR BLD: 63 % (ref 44–72)
NRBC # BLD AUTO: 0 K/UL
NRBC BLD-RTO: 0 /100 WBC (ref 0–0.2)
OUTPT INFUS CBC COMMENT OICOM: ABNORMAL
PLATELET # BLD AUTO: 238 K/UL (ref 164–446)
PMV BLD AUTO: 9.3 FL (ref 9–12.9)
POTASSIUM SERPL-SCNC: 4 MMOL/L (ref 3.6–5.5)
PROT SERPL-MCNC: 7 G/DL (ref 6–8.2)
RBC # BLD AUTO: 4.18 M/UL (ref 4.2–5.4)
SODIUM SERPL-SCNC: 144 MMOL/L (ref 135–145)
WBC # BLD AUTO: 5.8 K/UL (ref 4.8–10.8)

## 2024-10-26 PROCEDURE — 85652 RBC SED RATE AUTOMATED: CPT

## 2024-10-26 PROCEDURE — 96413 CHEMO IV INFUSION 1 HR: CPT

## 2024-10-26 PROCEDURE — 80053 COMPREHEN METABOLIC PANEL: CPT

## 2024-10-26 PROCEDURE — 85025 COMPLETE CBC W/AUTO DIFF WBC: CPT

## 2024-10-26 PROCEDURE — 700111 HCHG RX REV CODE 636 W/ 250 OVERRIDE (IP): Mod: JZ | Performed by: INTERNAL MEDICINE

## 2024-10-26 PROCEDURE — 86140 C-REACTIVE PROTEIN: CPT

## 2024-10-26 PROCEDURE — 700105 HCHG RX REV CODE 258: Performed by: INTERNAL MEDICINE

## 2024-10-26 RX ORDER — DIPHENHYDRAMINE HCL 25 MG
25 TABLET ORAL ONCE
Status: DISCONTINUED | OUTPATIENT
Start: 2024-10-26 | End: 2024-10-26 | Stop reason: HOSPADM

## 2024-10-26 RX ORDER — ACETAMINOPHEN 500 MG
500 TABLET ORAL ONCE
OUTPATIENT
Start: 2024-11-09

## 2024-10-26 RX ORDER — ACETAMINOPHEN 500 MG
500 TABLET ORAL ONCE
Status: DISCONTINUED | OUTPATIENT
Start: 2024-10-26 | End: 2024-10-26 | Stop reason: HOSPADM

## 2024-10-26 RX ORDER — DIPHENHYDRAMINE HCL 25 MG
25 TABLET ORAL ONCE
OUTPATIENT
Start: 2024-11-09 | End: 2024-11-09

## 2024-10-26 RX ADMIN — INFLIXIMAB-AXXQ 400 MG: 100 INJECTION, POWDER, LYOPHILIZED, FOR SOLUTION INTRAVENOUS at 15:50

## 2024-10-26 ASSESSMENT — FIBROSIS 4 INDEX: FIB4 SCORE: 1.18

## 2024-12-21 ENCOUNTER — OUTPATIENT INFUSION SERVICES (OUTPATIENT)
Dept: ONCOLOGY | Facility: MEDICAL CENTER | Age: 64
End: 2024-12-21
Attending: STUDENT IN AN ORGANIZED HEALTH CARE EDUCATION/TRAINING PROGRAM
Payer: COMMERCIAL

## 2024-12-21 VITALS
TEMPERATURE: 96.6 F | DIASTOLIC BLOOD PRESSURE: 71 MMHG | HEIGHT: 66 IN | OXYGEN SATURATION: 97 % | BODY MASS INDEX: 26.29 KG/M2 | HEART RATE: 82 BPM | RESPIRATION RATE: 18 BRPM | WEIGHT: 163.58 LBS | SYSTOLIC BLOOD PRESSURE: 127 MMHG

## 2024-12-21 DIAGNOSIS — D86.9 SARCOIDOSIS: ICD-10-CM

## 2024-12-21 PROCEDURE — 700111 HCHG RX REV CODE 636 W/ 250 OVERRIDE (IP): Mod: JZ | Performed by: INTERNAL MEDICINE

## 2024-12-21 PROCEDURE — 96413 CHEMO IV INFUSION 1 HR: CPT

## 2024-12-21 PROCEDURE — 700105 HCHG RX REV CODE 258: Performed by: INTERNAL MEDICINE

## 2024-12-21 RX ORDER — ACETAMINOPHEN 500 MG
500 TABLET ORAL ONCE
OUTPATIENT
Start: 2025-01-18

## 2024-12-21 RX ORDER — DIPHENHYDRAMINE HCL 25 MG
25 TABLET ORAL ONCE
OUTPATIENT
Start: 2025-01-18 | End: 2025-01-18

## 2024-12-21 RX ORDER — DIPHENHYDRAMINE HCL 25 MG
25 TABLET ORAL ONCE
Status: DISCONTINUED | OUTPATIENT
Start: 2024-12-21 | End: 2024-12-21 | Stop reason: HOSPADM

## 2024-12-21 RX ORDER — ACETAMINOPHEN 500 MG
500 TABLET ORAL ONCE
Status: DISCONTINUED | OUTPATIENT
Start: 2024-12-21 | End: 2024-12-21 | Stop reason: HOSPADM

## 2024-12-21 RX ADMIN — INFLIXIMAB-AXXQ 400 MG: 100 INJECTION, POWDER, LYOPHILIZED, FOR SOLUTION INTRAVENOUS at 15:35

## 2024-12-21 ASSESSMENT — FIBROSIS 4 INDEX: FIB4 SCORE: 1.09

## 2024-12-21 ASSESSMENT — PAIN DESCRIPTION - PAIN TYPE: TYPE: ACUTE PAIN

## 2024-12-22 NOTE — PROGRESS NOTES
Alesia presented to [facility] for every 8-week Infliximab. Pt reported no new concerns today and denied any current s/sx of infection or open wounds. PIV started to LAC, brisk blood return observed and flushed easily. Infliximab infused over one hour with filter in place with no s/sx of adverse reaction. PIV flushed, removed and gauze and Coban dressing placed. Schedulers emailed about future appointments. Pt discharged to home in good condition with family.

## 2025-01-29 ENCOUNTER — APPOINTMENT (OUTPATIENT)
Dept: MEDICAL GROUP | Facility: PHYSICIAN GROUP | Age: 65
End: 2025-01-29
Payer: COMMERCIAL

## 2025-02-19 ENCOUNTER — APPOINTMENT (OUTPATIENT)
Dept: ONCOLOGY | Facility: MEDICAL CENTER | Age: 65
End: 2025-02-19
Attending: STUDENT IN AN ORGANIZED HEALTH CARE EDUCATION/TRAINING PROGRAM
Payer: COMMERCIAL

## 2025-02-27 ENCOUNTER — OUTPATIENT INFUSION SERVICES (OUTPATIENT)
Dept: ONCOLOGY | Facility: MEDICAL CENTER | Age: 65
End: 2025-02-27
Attending: STUDENT IN AN ORGANIZED HEALTH CARE EDUCATION/TRAINING PROGRAM
Payer: COMMERCIAL

## 2025-02-27 VITALS
HEIGHT: 66 IN | DIASTOLIC BLOOD PRESSURE: 77 MMHG | RESPIRATION RATE: 18 BRPM | OXYGEN SATURATION: 98 % | HEART RATE: 82 BPM | SYSTOLIC BLOOD PRESSURE: 104 MMHG | TEMPERATURE: 97 F | WEIGHT: 164.9 LBS | BODY MASS INDEX: 26.5 KG/M2

## 2025-02-27 DIAGNOSIS — D86.9 SARCOIDOSIS: ICD-10-CM

## 2025-02-27 LAB
ALBUMIN SERPL BCP-MCNC: 4.4 G/DL (ref 3.2–4.9)
ALBUMIN/GLOB SERPL: 1.5 G/DL
ALP SERPL-CCNC: 72 U/L (ref 30–99)
ALT SERPL-CCNC: 24 U/L (ref 2–50)
ANION GAP SERPL CALC-SCNC: 13 MMOL/L (ref 7–16)
AST SERPL-CCNC: 28 U/L (ref 12–45)
BASOPHILS # BLD AUTO: 1.2 % (ref 0–1.8)
BASOPHILS # BLD: 0.06 K/UL (ref 0–0.12)
BILIRUB SERPL-MCNC: 1 MG/DL (ref 0.1–1.5)
BUN SERPL-MCNC: 21 MG/DL (ref 8–22)
CALCIUM ALBUM COR SERPL-MCNC: 8.7 MG/DL (ref 8.5–10.5)
CALCIUM SERPL-MCNC: 9 MG/DL (ref 8.5–10.5)
CHLORIDE SERPL-SCNC: 101 MMOL/L (ref 96–112)
CO2 SERPL-SCNC: 25 MMOL/L (ref 20–33)
CREAT SERPL-MCNC: 1.07 MG/DL (ref 0.5–1.4)
CRP SERPL HS-MCNC: <0.3 MG/DL (ref 0–0.75)
EOSINOPHIL # BLD AUTO: 0.25 K/UL (ref 0–0.51)
EOSINOPHIL NFR BLD: 5.1 % (ref 0–6.9)
ERYTHROCYTE [DISTWIDTH] IN BLOOD BY AUTOMATED COUNT: 43.4 FL (ref 35.9–50)
ERYTHROCYTE [SEDIMENTATION RATE] IN BLOOD BY WESTERGREN METHOD: 15 MM/HOUR (ref 0–25)
GFR SERPLBLD CREATININE-BSD FMLA CKD-EPI: 59 ML/MIN/1.73 M 2
GLOBULIN SER CALC-MCNC: 2.9 G/DL (ref 1.9–3.5)
GLUCOSE SERPL-MCNC: 89 MG/DL (ref 65–99)
HCT VFR BLD AUTO: 41.5 % (ref 37–47)
HGB BLD-MCNC: 13.7 G/DL (ref 12–16)
IMM GRANULOCYTES # BLD AUTO: 0.01 K/UL (ref 0–0.11)
IMM GRANULOCYTES NFR BLD AUTO: 0.2 % (ref 0–0.9)
LYMPHOCYTES # BLD AUTO: 1.24 K/UL (ref 1–4.8)
LYMPHOCYTES NFR BLD: 25.4 % (ref 22–41)
MCH RBC QN AUTO: 31.6 PG (ref 27–33)
MCHC RBC AUTO-ENTMCNC: 33 G/DL (ref 32.2–35.5)
MCV RBC AUTO: 95.6 FL (ref 81.4–97.8)
MONOCYTES # BLD AUTO: 0.51 K/UL (ref 0–0.85)
MONOCYTES NFR BLD AUTO: 10.4 % (ref 0–13.4)
NEUTROPHILS # BLD AUTO: 2.82 K/UL (ref 1.82–7.42)
NEUTROPHILS NFR BLD: 57.7 % (ref 44–72)
NRBC # BLD AUTO: 0 K/UL
NRBC BLD-RTO: 0 /100 WBC (ref 0–0.2)
OUTPT INFUS CBC COMMENT OICOM: NORMAL
PLATELET # BLD AUTO: 214 K/UL (ref 164–446)
PMV BLD AUTO: 9.6 FL (ref 9–12.9)
POTASSIUM SERPL-SCNC: 4.4 MMOL/L (ref 3.6–5.5)
PROT SERPL-MCNC: 7.3 G/DL (ref 6–8.2)
RBC # BLD AUTO: 4.34 M/UL (ref 4.2–5.4)
SODIUM SERPL-SCNC: 139 MMOL/L (ref 135–145)
WBC # BLD AUTO: 4.9 K/UL (ref 4.8–10.8)

## 2025-02-27 PROCEDURE — 700111 HCHG RX REV CODE 636 W/ 250 OVERRIDE (IP): Mod: JZ | Performed by: INTERNAL MEDICINE

## 2025-02-27 PROCEDURE — 80053 COMPREHEN METABOLIC PANEL: CPT

## 2025-02-27 PROCEDURE — 96365 THER/PROPH/DIAG IV INF INIT: CPT

## 2025-02-27 PROCEDURE — 86140 C-REACTIVE PROTEIN: CPT

## 2025-02-27 PROCEDURE — 85025 COMPLETE CBC W/AUTO DIFF WBC: CPT

## 2025-02-27 PROCEDURE — 700105 HCHG RX REV CODE 258: Performed by: INTERNAL MEDICINE

## 2025-02-27 PROCEDURE — 85652 RBC SED RATE AUTOMATED: CPT

## 2025-02-27 PROCEDURE — 96413 CHEMO IV INFUSION 1 HR: CPT

## 2025-02-27 RX ORDER — ACETAMINOPHEN 500 MG
500 TABLET ORAL ONCE
Status: DISCONTINUED | OUTPATIENT
Start: 2025-02-27 | End: 2025-02-27 | Stop reason: HOSPADM

## 2025-02-27 RX ORDER — ACETAMINOPHEN 500 MG
500 TABLET ORAL ONCE
OUTPATIENT
Start: 2025-04-10

## 2025-02-27 RX ORDER — DIPHENHYDRAMINE HCL 25 MG
25 TABLET ORAL ONCE
OUTPATIENT
Start: 2025-04-10 | End: 2025-04-10

## 2025-02-27 RX ORDER — DIPHENHYDRAMINE HCL 25 MG
25 TABLET ORAL ONCE
Status: DISCONTINUED | OUTPATIENT
Start: 2025-02-27 | End: 2025-02-27 | Stop reason: HOSPADM

## 2025-02-27 RX ADMIN — INFLIXIMAB-AXXQ 400 MG: 100 INJECTION, POWDER, LYOPHILIZED, FOR SOLUTION INTRAVENOUS at 14:17

## 2025-02-27 ASSESSMENT — FIBROSIS 4 INDEX: FIB4 SCORE: 1.11

## 2025-02-27 ASSESSMENT — PAIN DESCRIPTION - PAIN TYPE: TYPE: ACUTE PAIN;CHRONIC PAIN

## 2025-02-27 NOTE — PROGRESS NOTES
Alesia presents to infusion for Q8 week Avsola infusion (last 12/21/24). Patient denies recent wounds, illness, infection or live vaccination. Confirmed Hep B and TB labs done January 2024.      PIV started L AC with positive blood return and flushes well. Q12 week labs drawn per orders.    Pt declined pre-treatment meds.      Avsola infused over 1 hour with filter, patient tolerated well with no adverse effects.      PIV flushed post infusion with positive blood return., d/sabiha with tip intact, site unremarkable, dressed with gauze and coban. Patient left in stable condition, NAD. Emailed scheduling requesting Q8 week appointment for Alesia - she knows to check [facility] for appointment.

## 2025-04-17 ENCOUNTER — HOSPITAL ENCOUNTER (OUTPATIENT)
Dept: RADIOLOGY | Facility: MEDICAL CENTER | Age: 65
End: 2025-04-17
Attending: INTERNAL MEDICINE
Payer: COMMERCIAL

## 2025-04-17 DIAGNOSIS — D86.9 SARCOIDOSIS: ICD-10-CM

## 2025-04-17 PROCEDURE — 700117 HCHG RX CONTRAST REV CODE 255: Performed by: INTERNAL MEDICINE

## 2025-04-17 PROCEDURE — 71250 CT THORAX DX C-: CPT

## 2025-04-17 PROCEDURE — 74177 CT ABD & PELVIS W/CONTRAST: CPT

## 2025-04-17 RX ADMIN — IOHEXOL 100 ML: 350 INJECTION, SOLUTION INTRAVENOUS at 14:14

## 2025-04-17 RX ADMIN — IOHEXOL 25 ML: 240 INJECTION, SOLUTION INTRATHECAL; INTRAVASCULAR; INTRAVENOUS; ORAL at 14:15

## 2025-04-24 ENCOUNTER — OUTPATIENT INFUSION SERVICES (OUTPATIENT)
Dept: ONCOLOGY | Facility: MEDICAL CENTER | Age: 65
End: 2025-04-24
Attending: INTERNAL MEDICINE
Payer: COMMERCIAL

## 2025-04-24 VITALS
SYSTOLIC BLOOD PRESSURE: 126 MMHG | HEART RATE: 77 BPM | RESPIRATION RATE: 18 BRPM | HEIGHT: 66 IN | WEIGHT: 159.17 LBS | TEMPERATURE: 97.3 F | BODY MASS INDEX: 25.58 KG/M2 | OXYGEN SATURATION: 99 % | DIASTOLIC BLOOD PRESSURE: 76 MMHG

## 2025-04-24 DIAGNOSIS — D86.9 SARCOIDOSIS: ICD-10-CM

## 2025-04-24 PROCEDURE — 700105 HCHG RX REV CODE 258: Performed by: INTERNAL MEDICINE

## 2025-04-24 PROCEDURE — 96413 CHEMO IV INFUSION 1 HR: CPT

## 2025-04-24 PROCEDURE — 700111 HCHG RX REV CODE 636 W/ 250 OVERRIDE (IP): Mod: JZ | Performed by: INTERNAL MEDICINE

## 2025-04-24 RX ORDER — ACETAMINOPHEN 500 MG
500 TABLET ORAL ONCE
OUTPATIENT
Start: 2025-05-22

## 2025-04-24 RX ORDER — ACETAMINOPHEN 500 MG
500 TABLET ORAL ONCE
Status: DISCONTINUED | OUTPATIENT
Start: 2025-04-24 | End: 2025-04-24 | Stop reason: HOSPADM

## 2025-04-24 RX ORDER — DIPHENHYDRAMINE HCL 25 MG
25 TABLET ORAL ONCE
Status: DISCONTINUED | OUTPATIENT
Start: 2025-04-24 | End: 2025-04-24 | Stop reason: HOSPADM

## 2025-04-24 RX ORDER — DIPHENHYDRAMINE HCL 25 MG
25 TABLET ORAL ONCE
OUTPATIENT
Start: 2025-05-22 | End: 2025-05-22

## 2025-04-24 RX ADMIN — INFLIXIMAB-AXXQ 400 MG: 100 INJECTION, POWDER, LYOPHILIZED, FOR SOLUTION INTRAVENOUS at 12:05

## 2025-04-24 ASSESSMENT — FIBROSIS 4 INDEX: FIB4 SCORE: 1.66

## 2025-04-24 NOTE — PROGRESS NOTES
Pt arrives to [facility] for Infliximab for sarcoidosis.  Pt denies s/s of infection or worsening of symptoms.  Pt has chronic pain and rates pain 5/10 today.  Infliximab infused over 1 hour without adverse reaction.  PIV flushed with NS and site removed.  Site wrapped with pressure dressing.  Confirmed next appt time on 6/19/25 with pt.  Pt dc home to self care.

## 2025-06-19 ENCOUNTER — OUTPATIENT INFUSION SERVICES (OUTPATIENT)
Dept: ONCOLOGY | Facility: MEDICAL CENTER | Age: 65
End: 2025-06-19
Attending: INTERNAL MEDICINE
Payer: COMMERCIAL

## 2025-06-19 VITALS
HEIGHT: 66 IN | RESPIRATION RATE: 18 BRPM | DIASTOLIC BLOOD PRESSURE: 73 MMHG | BODY MASS INDEX: 25.51 KG/M2 | WEIGHT: 158.73 LBS | OXYGEN SATURATION: 98 % | SYSTOLIC BLOOD PRESSURE: 117 MMHG | TEMPERATURE: 97.6 F | HEART RATE: 72 BPM

## 2025-06-19 DIAGNOSIS — D86.9 SARCOIDOSIS: Primary | ICD-10-CM

## 2025-06-19 LAB
ALBUMIN SERPL BCP-MCNC: 4.2 G/DL (ref 3.2–4.9)
ALBUMIN/GLOB SERPL: 1.4 G/DL
ALP SERPL-CCNC: 73 U/L (ref 30–99)
ALT SERPL-CCNC: 32 U/L (ref 2–50)
ANION GAP SERPL CALC-SCNC: 10 MMOL/L (ref 7–16)
AST SERPL-CCNC: 25 U/L (ref 12–45)
BASOPHILS # BLD AUTO: 1 % (ref 0–1.8)
BASOPHILS # BLD: 0.05 K/UL (ref 0–0.12)
BILIRUB SERPL-MCNC: 1.1 MG/DL (ref 0.1–1.5)
BUN SERPL-MCNC: 14 MG/DL (ref 8–22)
CALCIUM ALBUM COR SERPL-MCNC: 8.5 MG/DL (ref 8.5–10.5)
CALCIUM SERPL-MCNC: 8.7 MG/DL (ref 8.5–10.5)
CHLORIDE SERPL-SCNC: 104 MMOL/L (ref 96–112)
CO2 SERPL-SCNC: 23 MMOL/L (ref 20–33)
CREAT SERPL-MCNC: 0.96 MG/DL (ref 0.5–1.4)
CRP SERPL HS-MCNC: <0.3 MG/DL (ref 0–0.75)
EOSINOPHIL # BLD AUTO: 0.21 K/UL (ref 0–0.51)
EOSINOPHIL NFR BLD: 4.3 % (ref 0–6.9)
ERYTHROCYTE [DISTWIDTH] IN BLOOD BY AUTOMATED COUNT: 43.1 FL (ref 35.9–50)
ERYTHROCYTE [SEDIMENTATION RATE] IN BLOOD BY WESTERGREN METHOD: 17 MM/HOUR (ref 0–25)
GFR SERPLBLD CREATININE-BSD FMLA CKD-EPI: 67 ML/MIN/1.73 M 2
GLOBULIN SER CALC-MCNC: 3 G/DL (ref 1.9–3.5)
GLUCOSE SERPL-MCNC: 89 MG/DL (ref 65–99)
HCT VFR BLD AUTO: 40.4 % (ref 37–47)
HGB BLD-MCNC: 13.8 G/DL (ref 12–16)
IMM GRANULOCYTES # BLD AUTO: 0.01 K/UL (ref 0–0.11)
IMM GRANULOCYTES NFR BLD AUTO: 0.2 % (ref 0–0.9)
LYMPHOCYTES # BLD AUTO: 1.12 K/UL (ref 1–4.8)
LYMPHOCYTES NFR BLD: 23 % (ref 22–41)
MCH RBC QN AUTO: 31.9 PG (ref 27–33)
MCHC RBC AUTO-ENTMCNC: 34.2 G/DL (ref 32.2–35.5)
MCV RBC AUTO: 93.5 FL (ref 81.4–97.8)
MONOCYTES # BLD AUTO: 0.36 K/UL (ref 0–0.85)
MONOCYTES NFR BLD AUTO: 7.4 % (ref 0–13.4)
NEUTROPHILS # BLD AUTO: 3.11 K/UL (ref 1.82–7.42)
NEUTROPHILS NFR BLD: 64.1 % (ref 44–72)
NRBC # BLD AUTO: 0 K/UL
NRBC BLD-RTO: 0 /100 WBC (ref 0–0.2)
OUTPT INFUS CBC COMMENT OICOM: NORMAL
PLATELET # BLD AUTO: 257 K/UL (ref 164–446)
PMV BLD AUTO: 9.3 FL (ref 9–12.9)
POTASSIUM SERPL-SCNC: 4.6 MMOL/L (ref 3.6–5.5)
PROT SERPL-MCNC: 7.2 G/DL (ref 6–8.2)
RBC # BLD AUTO: 4.32 M/UL (ref 4.2–5.4)
SODIUM SERPL-SCNC: 137 MMOL/L (ref 135–145)
WBC # BLD AUTO: 4.9 K/UL (ref 4.8–10.8)

## 2025-06-19 PROCEDURE — 96413 CHEMO IV INFUSION 1 HR: CPT

## 2025-06-19 PROCEDURE — 96365 THER/PROPH/DIAG IV INF INIT: CPT

## 2025-06-19 PROCEDURE — 700105 HCHG RX REV CODE 258: Performed by: INTERNAL MEDICINE

## 2025-06-19 PROCEDURE — 700111 HCHG RX REV CODE 636 W/ 250 OVERRIDE (IP): Mod: JW | Performed by: INTERNAL MEDICINE

## 2025-06-19 PROCEDURE — 85025 COMPLETE CBC W/AUTO DIFF WBC: CPT

## 2025-06-19 PROCEDURE — 85652 RBC SED RATE AUTOMATED: CPT

## 2025-06-19 PROCEDURE — 86140 C-REACTIVE PROTEIN: CPT

## 2025-06-19 PROCEDURE — 80053 COMPREHEN METABOLIC PANEL: CPT

## 2025-06-19 RX ORDER — DIPHENHYDRAMINE HCL 25 MG
25 TABLET ORAL ONCE
OUTPATIENT
Start: 2025-08-14 | End: 2025-08-14

## 2025-06-19 RX ORDER — ACETAMINOPHEN 500 MG
500 TABLET ORAL ONCE
OUTPATIENT
Start: 2025-08-14

## 2025-06-19 RX ORDER — ACETAMINOPHEN 500 MG
500 TABLET ORAL ONCE
Status: DISCONTINUED | OUTPATIENT
Start: 2025-06-19 | End: 2025-06-19 | Stop reason: HOSPADM

## 2025-06-19 RX ORDER — DIPHENHYDRAMINE HCL 25 MG
25 TABLET ORAL ONCE
Status: DISCONTINUED | OUTPATIENT
Start: 2025-06-19 | End: 2025-06-19 | Stop reason: HOSPADM

## 2025-06-19 RX ADMIN — INFLIXIMAB-AXXQ 350 MG: 100 INJECTION, POWDER, LYOPHILIZED, FOR SOLUTION INTRAVENOUS at 12:29

## 2025-06-19 ASSESSMENT — FIBROSIS 4 INDEX: FIB4 SCORE: 1.66

## 2025-06-19 NOTE — PROGRESS NOTES
Ms Lewis is here today for avola for Sarcoidosis.    She has no new concerns to report.      IV placed to her right arm. Labs drawn today.     Pt declined pre medications.     Avsola administered per MAR using a filter.     IV removed and she was discharged in stable condition.

## 2025-08-22 ENCOUNTER — OUTPATIENT INFUSION SERVICES (OUTPATIENT)
Dept: ONCOLOGY | Facility: MEDICAL CENTER | Age: 65
End: 2025-08-22
Attending: INTERNAL MEDICINE
Payer: COMMERCIAL

## 2025-08-22 VITALS
BODY MASS INDEX: 25.79 KG/M2 | DIASTOLIC BLOOD PRESSURE: 69 MMHG | WEIGHT: 160.5 LBS | HEART RATE: 69 BPM | TEMPERATURE: 96.8 F | OXYGEN SATURATION: 98 % | SYSTOLIC BLOOD PRESSURE: 116 MMHG | RESPIRATION RATE: 18 BRPM | HEIGHT: 66 IN

## 2025-08-22 DIAGNOSIS — D86.9 SARCOIDOSIS: Primary | ICD-10-CM

## 2025-08-22 PROCEDURE — 96413 CHEMO IV INFUSION 1 HR: CPT

## 2025-08-22 PROCEDURE — 96365 THER/PROPH/DIAG IV INF INIT: CPT

## 2025-08-22 PROCEDURE — 700111 HCHG RX REV CODE 636 W/ 250 OVERRIDE (IP): Mod: JZ | Performed by: INTERNAL MEDICINE

## 2025-08-22 PROCEDURE — 700105 HCHG RX REV CODE 258: Performed by: INTERNAL MEDICINE

## 2025-08-22 RX ORDER — DIPHENHYDRAMINE HCL 25 MG
25 TABLET ORAL ONCE
Status: DISCONTINUED | OUTPATIENT
Start: 2025-08-22 | End: 2025-08-22 | Stop reason: HOSPADM

## 2025-08-22 RX ORDER — DIPHENHYDRAMINE HCL 25 MG
25 TABLET ORAL ONCE
OUTPATIENT
Start: 2025-09-12 | End: 2025-09-12

## 2025-08-22 RX ORDER — ACETAMINOPHEN 500 MG
500 TABLET ORAL ONCE
OUTPATIENT
Start: 2025-09-12

## 2025-08-22 RX ORDER — ACETAMINOPHEN 500 MG
500 TABLET ORAL ONCE
Status: DISCONTINUED | OUTPATIENT
Start: 2025-08-22 | End: 2025-08-22 | Stop reason: HOSPADM

## 2025-08-22 RX ADMIN — INFLIXIMAB-AXXQ 400 MG: 100 INJECTION, POWDER, LYOPHILIZED, FOR SOLUTION INTRAVENOUS at 09:13

## 2025-08-22 ASSESSMENT — FIBROSIS 4 INDEX: FIB4 SCORE: 1.07

## (undated) DEVICE — SOD. CHL 10CC SYRINGE PREFILL - W/10 CC (30/BX)

## (undated) DEVICE — TRAP SPECIMEN MUCUS STERILE - (50/CA)

## (undated) DEVICE — LACTATED RINGERS INJ 1000 ML - (14EA/CA 60CA/PF)

## (undated) DEVICE — CONTAINER, SPECIMEN, STERILE

## (undated) DEVICE — TUBE CONNECTING SUCTION - CLEAR PLASTIC STERILE 72 IN (50EA/CA)

## (undated) DEVICE — NEEDLE BIOPSY 22G W/ LOCKABLE SYRINGE FOR EBUS (5EA/BX)

## (undated) DEVICE — CONNECTOR ELBOW  DOUBLE SWIVEL W/ 7.6MM AND 9.5MM PORT

## (undated) DEVICE — MASK, LARYNGEAL AIRWAY #4

## (undated) DEVICE — TOWELS CLOTH SURGICAL - (4/PK 20PK/CA)

## (undated) DEVICE — SODIUM CHL IRRIGATION 0.9% 1000ML (12EA/CA)

## (undated) DEVICE — Device

## (undated) DEVICE — SYRINGE SAFETY 10 ML 18 GA X 1 1/2 BLUNT LL (100/BX 4BX/CA)